# Patient Record
Sex: FEMALE | Race: WHITE | NOT HISPANIC OR LATINO | Employment: FULL TIME | URBAN - METROPOLITAN AREA
[De-identification: names, ages, dates, MRNs, and addresses within clinical notes are randomized per-mention and may not be internally consistent; named-entity substitution may affect disease eponyms.]

---

## 2023-02-16 PROBLEM — G47.00 INSOMNIA, UNSPECIFIED: Status: ACTIVE | Noted: 2023-02-16

## 2023-02-16 PROBLEM — R53.83 FATIGUE: Status: ACTIVE | Noted: 2023-02-16

## 2023-02-16 PROBLEM — F33.0 MILD EPISODE OF RECURRENT MAJOR DEPRESSIVE DISORDER (CMS-HCC): Status: ACTIVE | Noted: 2023-02-16

## 2023-02-16 PROBLEM — F41.1 ANXIETY, GENERALIZED: Status: ACTIVE | Noted: 2023-02-16

## 2023-02-16 PROBLEM — R68.89 COLD INTOLERANCE: Status: ACTIVE | Noted: 2023-02-16

## 2023-02-16 PROBLEM — R00.0 TACHYCARDIA: Status: ACTIVE | Noted: 2023-02-16

## 2023-02-16 PROBLEM — N91.1 SECONDARY AMENORRHEA: Status: ACTIVE | Noted: 2023-02-16

## 2023-02-16 RX ORDER — ALBUTEROL SULFATE 90 UG/1
2 AEROSOL, METERED RESPIRATORY (INHALATION) EVERY 6 HOURS
COMMUNITY
Start: 2022-03-24 | End: 2023-03-27 | Stop reason: ALTCHOICE

## 2023-02-16 RX ORDER — FLUOXETINE 10 MG/1
1 CAPSULE ORAL DAILY
COMMUNITY
Start: 2022-10-27 | End: 2023-03-29 | Stop reason: SDUPTHER

## 2023-02-16 RX ORDER — NORETHINDRONE ACETATE AND ETHINYL ESTRADIOL 1.5-30(21)
1 KIT ORAL DAILY
COMMUNITY
Start: 2021-01-04 | End: 2023-12-22

## 2023-03-27 PROBLEM — R53.83 FATIGUE: Status: RESOLVED | Noted: 2023-02-16 | Resolved: 2023-03-27

## 2023-03-27 PROBLEM — R00.0 TACHYCARDIA: Status: RESOLVED | Noted: 2023-02-16 | Resolved: 2023-03-27

## 2023-03-27 ASSESSMENT — ENCOUNTER SYMPTOMS
VOMITING: 0
CONFUSION: 0
DYSURIA: 0
TROUBLE SWALLOWING: 0
FEVER: 0
FREQUENCY: 0
ADENOPATHY: 0
BLOOD IN STOOL: 0
UNEXPECTED WEIGHT CHANGE: 0
PALPITATIONS: 0
NECK PAIN: 0
SORE THROAT: 0
ABDOMINAL PAIN: 0
BRUISES/BLEEDS EASILY: 0
EYE PAIN: 0
POLYDIPSIA: 0
WOUND: 0
HEADACHES: 0
HEMATURIA: 0
FATIGUE: 0
POLYPHAGIA: 0
EYE REDNESS: 0
SHORTNESS OF BREATH: 0
DIZZINESS: 0
HALLUCINATIONS: 0
BACK PAIN: 0
EYE DISCHARGE: 0
CHILLS: 0
COUGH: 0
APPETITE CHANGE: 0

## 2023-03-29 ENCOUNTER — OFFICE VISIT (OUTPATIENT)
Dept: PRIMARY CARE | Facility: CLINIC | Age: 28
End: 2023-03-29
Payer: COMMERCIAL

## 2023-03-29 VITALS
WEIGHT: 149.91 LBS | OXYGEN SATURATION: 95 % | DIASTOLIC BLOOD PRESSURE: 76 MMHG | SYSTOLIC BLOOD PRESSURE: 111 MMHG | BODY MASS INDEX: 26.56 KG/M2 | HEART RATE: 92 BPM | TEMPERATURE: 98.6 F | HEIGHT: 63 IN

## 2023-03-29 DIAGNOSIS — F33.0 MILD EPISODE OF RECURRENT MAJOR DEPRESSIVE DISORDER (CMS-HCC): ICD-10-CM

## 2023-03-29 DIAGNOSIS — F41.1 ANXIETY, GENERALIZED: ICD-10-CM

## 2023-03-29 DIAGNOSIS — Z00.00 ROUTINE GENERAL MEDICAL EXAMINATION AT A HEALTH CARE FACILITY: Primary | ICD-10-CM

## 2023-03-29 PROCEDURE — 3008F BODY MASS INDEX DOCD: CPT | Performed by: NURSE PRACTITIONER

## 2023-03-29 PROCEDURE — 99395 PREV VISIT EST AGE 18-39: CPT | Performed by: NURSE PRACTITIONER

## 2023-03-29 PROCEDURE — 1036F TOBACCO NON-USER: CPT | Performed by: NURSE PRACTITIONER

## 2023-03-29 RX ORDER — FLUOXETINE 10 MG/1
10 CAPSULE ORAL DAILY
Qty: 90 CAPSULE | Refills: 3 | Status: SHIPPED | OUTPATIENT
Start: 2023-03-29 | End: 2023-06-01 | Stop reason: SDUPTHER

## 2023-03-29 ASSESSMENT — ANXIETY QUESTIONNAIRES
4. TROUBLE RELAXING: NOT AT ALL
3. WORRYING TOO MUCH ABOUT DIFFERENT THINGS: NOT AT ALL
7. FEELING AFRAID AS IF SOMETHING AWFUL MIGHT HAPPEN: NOT AT ALL
1. FEELING NERVOUS, ANXIOUS, OR ON EDGE: NOT AT ALL
IF YOU CHECKED OFF ANY PROBLEMS ON THIS QUESTIONNAIRE, HOW DIFFICULT HAVE THESE PROBLEMS MADE IT FOR YOU TO DO YOUR WORK, TAKE CARE OF THINGS AT HOME, OR GET ALONG WITH OTHER PEOPLE: NOT DIFFICULT AT ALL
5. BEING SO RESTLESS THAT IT IS HARD TO SIT STILL: NOT AT ALL
2. NOT BEING ABLE TO STOP OR CONTROL WORRYING: NOT AT ALL
GAD7 TOTAL SCORE: 0
6. BECOMING EASILY ANNOYED OR IRRITABLE: NOT AT ALL

## 2023-03-29 ASSESSMENT — PATIENT HEALTH QUESTIONNAIRE - PHQ9
SUM OF ALL RESPONSES TO PHQ9 QUESTIONS 1 AND 2: 0
1. LITTLE INTEREST OR PLEASURE IN DOING THINGS: NOT AT ALL
6. FEELING BAD ABOUT YOURSELF - OR THAT YOU ARE A FAILURE OR HAVE LET YOURSELF OR YOUR FAMILY DOWN: NOT AT ALL
7. TROUBLE CONCENTRATING ON THINGS, SUCH AS READING THE NEWSPAPER OR WATCHING TELEVISION: NOT AT ALL
10. IF YOU CHECKED OFF ANY PROBLEMS, HOW DIFFICULT HAVE THESE PROBLEMS MADE IT FOR YOU TO DO YOUR WORK, TAKE CARE OF THINGS AT HOME, OR GET ALONG WITH OTHER PEOPLE: NOT DIFFICULT AT ALL
SUM OF ALL RESPONSES TO PHQ QUESTIONS 1-9: 0
SUM OF ALL RESPONSES TO PHQ9 QUESTIONS 1 AND 2: 0
2. FEELING DOWN, DEPRESSED OR HOPELESS: NOT AT ALL
8. MOVING OR SPEAKING SO SLOWLY THAT OTHER PEOPLE COULD HAVE NOTICED. OR THE OPPOSITE, BEING SO FIGETY OR RESTLESS THAT YOU HAVE BEEN MOVING AROUND A LOT MORE THAN USUAL: NOT AT ALL
1. LITTLE INTEREST OR PLEASURE IN DOING THINGS: NOT AT ALL
4. FEELING TIRED OR HAVING LITTLE ENERGY: NOT AT ALL
3. TROUBLE FALLING OR STAYING ASLEEP OR SLEEPING TOO MUCH: NOT AT ALL
5. POOR APPETITE OR OVEREATING: NOT AT ALL
9. THOUGHTS THAT YOU WOULD BE BETTER OFF DEAD, OR OF HURTING YOURSELF: NOT AT ALL
2. FEELING DOWN, DEPRESSED OR HOPELESS: NOT AT ALL

## 2023-03-29 NOTE — PATIENT INSTRUCTIONS
Pt will let me know when she wants to switch to sertraline due to preparing for pregnancy and breastfeeding    Meds refilled. The number of refills on the meds match when you need to return to the office for an appt. I recommend making your next appt today so you don't run out of your medication as it may take me up to 3 days to refill it.    Handouts given to pt:  physical handout      I recommend signing up for MyChart.    Labs:    You can use the lab in our building when fasting. The hrs are: Monday-Thursday, 7 a.m. - 6 p.m., Friday, 7 a.m. - 5 p.m., Saturday 8 a.m. - 12 noon.   No appt needed, BUT YOU DO NEED THE PAPER ORDER.    Fasting is no food, drink, gum or mints other than water for 12 hrs.   Results will be back in 2-3 business days for most labs. It is always recommended for any orders (labs, xrays, ultrasounds,MRI, ct scan, procedures etc) to check with your insurance provider for expected costs or expenses to you.       You will get your results via phone from my medical assistant if you do not have MyChart.  OR  You will get your results via [employer]    If a result is urgent, I will call to speak to you.    Vaccines:  Up to date    General recommendations:  Exercise-cardio 4-5d/wk 30min each day  Diet-Breakfast-toast (my favorite Nat Bryant Delighful Multigrain or Vimal's Killer Bread Good Seed thin-sliced)/bagel/English muffin-whole wheat flour as a 1st ingredient or cereal/oatmeal/granola bar-fiber 4g or more or protein like eggs or peanut butter; optional veggies  Lunch-protein, 1/2c carb or 2 slices bread, veg 1c  Dinner-protein, fist sized carb, veg 1c  Fruit 2 a day  Dairy 2 a day-milk, soy milk, almond milk, cheese, yogurt, cottage cheese  Snacks-Protein-hard boiled egg, nuts (walnuts/almonds/pecans/pistachios 1/4c), hummus, beef/deer jerky or meat sticks; vegetable, fruit, dairy-milk(1%, skim, almond, soy)/cheese (not a lot of cheddar)/yogurt (Greek is best-my favorite Dannon Fruit on the Bottom  Greek)/cottage cheese 2%; triscuits/ popcorn/wheat thins have a lot of fiber; follow serving size on bag/box/container  increase water  Limit alcohol to 1 drink per day for women and 2 drinks per day for men (1 drink=12oz beer or 5oz wine or 1 1/2oz liquor)  Calcium: 500mg 1 twice a day if age 50 and younger and 600mg 1 twice a day if over age 50 (calcium citrate can be taken without food)  Vitamin D: 800-5000 IU/day  Limit salt to <2300mg a day if age 50 and under and <1500mg a day if over age 50/have high bp or diabetes or kidney disease  Recommend folate for childbearing age women 0.4mg per day (can be found in a multivitamin)  Recommend 18mg/dL of iron a day if age 50 and under and 8mg/dL a day if over age 50; take on an empty stomach at bedtime  Use sunscreen   Wear seatbelt  Recommend safe sex practices: using condoms everytime you have sex, discuss with a new partner about their past partners/history of STDs/drug use, avoid drinking alcohol or using drugs as this increases the chance that you will participate in high-risk sex, for oral sex help protect your mouth by having your partner use a condom (male or female), women should not douche after sex, be aware of your partner's body and your body-look for signs of a sore, blister, rash, or discharge, and have regular exams and periodic tests for STDs.  No distracted driving  No driving when under influence of substances  Wear a seatbelt  Eye dr every 1-2yrs  Dentist every 6-12 mon  Tetanus shot every 10yrs  Recommend flu vaccine in the fall  Appt in 1 year for physical      I will communicate with you via [employer] regarding messages and results. If you need help with this, you can call the support line at [phone].    IT WAS A PLEASURE TO SEE YOU TODAY. THANK YOU FOR CHOOSING US FOR YOUR HEALTHCARE NEEDS.

## 2023-06-01 DIAGNOSIS — F33.0 MILD EPISODE OF RECURRENT MAJOR DEPRESSIVE DISORDER (CMS-HCC): ICD-10-CM

## 2023-06-01 DIAGNOSIS — F41.1 ANXIETY, GENERALIZED: ICD-10-CM

## 2023-06-01 RX ORDER — FLUOXETINE 10 MG/1
10 CAPSULE ORAL DAILY
Qty: 90 CAPSULE | Refills: 3 | Status: SHIPPED | OUTPATIENT
Start: 2023-06-01 | End: 2024-04-02 | Stop reason: ALTCHOICE

## 2023-08-24 ENCOUNTER — TELEPHONE (OUTPATIENT)
Dept: PRIMARY CARE | Facility: CLINIC | Age: 28
End: 2023-08-24
Payer: COMMERCIAL

## 2023-08-24 NOTE — TELEPHONE ENCOUNTER
Called patient on 8/21/23 to make an appointment for  physical and left a message on her machine to call back.

## 2023-09-13 ENCOUNTER — OFFICE VISIT (OUTPATIENT)
Dept: PRIMARY CARE | Facility: CLINIC | Age: 28
End: 2023-09-13
Payer: COMMERCIAL

## 2023-09-13 VITALS
BODY MASS INDEX: 28.63 KG/M2 | HEIGHT: 63 IN | HEART RATE: 80 BPM | DIASTOLIC BLOOD PRESSURE: 66 MMHG | WEIGHT: 161.6 LBS | OXYGEN SATURATION: 98 % | SYSTOLIC BLOOD PRESSURE: 118 MMHG

## 2023-09-13 DIAGNOSIS — R20.0 BILATERAL HAND NUMBNESS: Primary | ICD-10-CM

## 2023-09-13 PROCEDURE — 1036F TOBACCO NON-USER: CPT | Performed by: NURSE PRACTITIONER

## 2023-09-13 PROCEDURE — 99213 OFFICE O/P EST LOW 20 MIN: CPT | Performed by: NURSE PRACTITIONER

## 2023-09-13 PROCEDURE — 3008F BODY MASS INDEX DOCD: CPT | Performed by: NURSE PRACTITIONER

## 2023-09-13 RX ORDER — PREDNISONE 20 MG/1
TABLET ORAL
Qty: 20 TABLET | Refills: 0 | Status: SHIPPED | OUTPATIENT
Start: 2023-09-13 | End: 2024-01-29 | Stop reason: ALTCHOICE

## 2023-09-13 ASSESSMENT — ENCOUNTER SYMPTOMS
SHORTNESS OF BREATH: 0
CONSTITUTIONAL NEGATIVE: 1
WHEEZING: 0

## 2023-09-13 NOTE — PATIENT INSTRUCTIONS
Prednisone  No advil, motrin, ibuprofen, aleve, naproxen or other anti-inflammatories while on prednisone.  Tylenol (acetaminophen) is OK to take.     If not helping by Monday, then let me know and I can order a nerve conduction test.    Return for wellness in march      I will communicate with you via [employer] regarding messages and results. If you need help with this, you can call the support line at [phone].    IT WAS A PLEASURE TO SEE YOU TODAY. THANK YOU FOR CHOOSING US FOR YOUR HEALTHCARE NEEDS.
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
## 2023-09-26 DIAGNOSIS — R06.02 SOB (SHORTNESS OF BREATH) ON EXERTION: Primary | ICD-10-CM

## 2023-09-26 RX ORDER — ALBUTEROL SULFATE 90 UG/1
2 AEROSOL, METERED RESPIRATORY (INHALATION) EVERY 6 HOURS PRN
Qty: 54 G | Refills: 1 | Status: SHIPPED | OUTPATIENT
Start: 2023-09-26 | End: 2024-09-25

## 2023-12-21 DIAGNOSIS — N91.1 SECONDARY AMENORRHEA: Primary | ICD-10-CM

## 2023-12-22 RX ORDER — NORETHINDRONE ACETATE AND ETHINYL ESTRADIOL 1.5-30(21)
1 KIT ORAL DAILY
Qty: 90 TABLET | Refills: 0 | Status: SHIPPED | OUTPATIENT
Start: 2023-12-22 | End: 2024-02-23

## 2024-01-28 NOTE — PROGRESS NOTES
Subjective   Patient ID: Liz Arango is a 28 y.o. female who presents for Fatigue.  Last physical:  3/29/23      What kind of sx does pt get with these energy crashes? Extreme tiredness  When did these sx start? 1 month. States she's always had similar issues but those seemed to be explained with lack of sleep at the time.  Now pt is getting enough sleep and working out regularly. Very minimum caffeine and alcohol intake.   Any other questions or concerns that pt wants to discuss today? no    Phq9  gad7    HPI  Energy crash mid afternoon about 2hrs after lunch -extreme tiredness-lay down and fall asleep for an hr at the most  Breakfast -whole wheat bagel, yogurt, turkey lisa and egg white lisa  Lunch-high protein tuna or chicken with 2 bread-macie's killer bread and grapes  Exercise daily  7hrs of sleep a night  1c coffee in am  Water 1 gallon a day    Off fluoxetine x 3mon    Bedtime 10p-8a  Able to fall asleep   Able to stay asleep  Snoring-no  Gasping for breath at night-no  Stresses-none diff than usual  Phq9=0  Gad7=0  No fall or injury  No jt swelling or jt aches  No diarrhea or constipation  No cp or heart racing  No leg swelling  No sob or wheezing; yesterday some tightness and sob-did not use albuterol inh  No tick bite  No drug use  No mono exposure  No tick bite  Seas allergies in spring  Any new meds or otc meds? no  Regular periods          Patient Care Team     Relationship Specialty Notifications Start End   Anna Pineda MD Consulting Physician Pediatric Endocrinology Admissions 3/27/23     Address:  [address] [place] [address] Pediatrics-Endocrinology/Metabolism [place] [id number]        Review of Systems   Constitutional: Negative.    Respiratory:  Negative for shortness of breath and wheezing.    Cardiovascular:  Negative for chest pain.       Objective   Visit Vitals  /77   Pulse 96   Temp 36.4 °C (97.5 °F)      BP Readings from Last 3 Encounters:   01/29/24 128/77   09/13/23 118/66    03/29/23 111/76     Wt Readings from Last 3 Encounters:   01/29/24 78.4 kg (172 lb 12.8 oz)   09/13/23 73.3 kg (161 lb 9.6 oz)   03/29/23 68 kg (149 lb 14.6 oz)       Physical Exam  Constitutional:       General: She is not in acute distress.     Appearance: Normal appearance.   Neurological:      Mental Status: She is alert.       Assessment/Plan   Diagnoses and all orders for this visit:  Fatigue, unspecified type  -     CBC and Auto Differential; Future  -     TSH with reflex to Free T4 if abnormal; Future  -     Vitamin D 25-Hydroxy,Total (for eval of Vitamin D levels); Future  -     Vitamin B12; Future  -     Comprehensive Metabolic Panel; Future  -     Fredrick-Barr Virus VCA, IgM; Future  Decreased energy  -     CBC and Auto Differential; Future  -     TSH with reflex to Free T4 if abnormal; Future  -     Vitamin D 25-Hydroxy,Total (for eval of Vitamin D levels); Future  -     Vitamin B12; Future  -     Comprehensive Metabolic Panel; Future  -     Fredrick-Barr Virus VCA, IgM; Future  Other orders  -     Follow Up In Primary Care - Health Maintenance; Future

## 2024-01-29 ENCOUNTER — LAB (OUTPATIENT)
Dept: LAB | Facility: LAB | Age: 29
End: 2024-01-29
Payer: COMMERCIAL

## 2024-01-29 ENCOUNTER — OFFICE VISIT (OUTPATIENT)
Dept: PRIMARY CARE | Facility: CLINIC | Age: 29
End: 2024-01-29
Payer: COMMERCIAL

## 2024-01-29 VITALS
TEMPERATURE: 97.5 F | BODY MASS INDEX: 30.62 KG/M2 | WEIGHT: 172.8 LBS | HEART RATE: 96 BPM | OXYGEN SATURATION: 98 % | DIASTOLIC BLOOD PRESSURE: 77 MMHG | SYSTOLIC BLOOD PRESSURE: 128 MMHG | HEIGHT: 63 IN

## 2024-01-29 DIAGNOSIS — R53.83 FATIGUE, UNSPECIFIED TYPE: ICD-10-CM

## 2024-01-29 DIAGNOSIS — R53.83 DECREASED ENERGY: ICD-10-CM

## 2024-01-29 DIAGNOSIS — R53.83 FATIGUE, UNSPECIFIED TYPE: Primary | ICD-10-CM

## 2024-01-29 LAB
25(OH)D3 SERPL-MCNC: 62 NG/ML (ref 30–100)
ALBUMIN SERPL BCP-MCNC: 4.2 G/DL (ref 3.4–5)
ALP SERPL-CCNC: 32 U/L (ref 33–110)
ALT SERPL W P-5'-P-CCNC: 11 U/L (ref 7–45)
ANION GAP SERPL CALC-SCNC: 10 MMOL/L (ref 10–20)
AST SERPL W P-5'-P-CCNC: 15 U/L (ref 9–39)
BASOPHILS # BLD AUTO: 0.05 X10*3/UL (ref 0–0.1)
BASOPHILS NFR BLD AUTO: 0.7 %
BILIRUB SERPL-MCNC: 0.7 MG/DL (ref 0–1.2)
BUN SERPL-MCNC: 16 MG/DL (ref 6–23)
CALCIUM SERPL-MCNC: 9.4 MG/DL (ref 8.6–10.6)
CHLORIDE SERPL-SCNC: 105 MMOL/L (ref 98–107)
CO2 SERPL-SCNC: 28 MMOL/L (ref 21–32)
CREAT SERPL-MCNC: 0.86 MG/DL (ref 0.5–1.05)
EGFRCR SERPLBLD CKD-EPI 2021: >90 ML/MIN/1.73M*2
EOSINOPHIL # BLD AUTO: 0.05 X10*3/UL (ref 0–0.7)
EOSINOPHIL NFR BLD AUTO: 0.7 %
ERYTHROCYTE [DISTWIDTH] IN BLOOD BY AUTOMATED COUNT: 11.5 % (ref 11.5–14.5)
GLUCOSE SERPL-MCNC: 88 MG/DL (ref 74–99)
HCT VFR BLD AUTO: 41 % (ref 36–46)
HGB BLD-MCNC: 13.7 G/DL (ref 12–16)
IMM GRANULOCYTES # BLD AUTO: 0.03 X10*3/UL (ref 0–0.7)
IMM GRANULOCYTES NFR BLD AUTO: 0.4 % (ref 0–0.9)
LYMPHOCYTES # BLD AUTO: 2.5 X10*3/UL (ref 1.2–4.8)
LYMPHOCYTES NFR BLD AUTO: 34 %
MCH RBC QN AUTO: 30.6 PG (ref 26–34)
MCHC RBC AUTO-ENTMCNC: 33.4 G/DL (ref 32–36)
MCV RBC AUTO: 92 FL (ref 80–100)
MONOCYTES # BLD AUTO: 0.51 X10*3/UL (ref 0.1–1)
MONOCYTES NFR BLD AUTO: 6.9 %
NEUTROPHILS # BLD AUTO: 4.21 X10*3/UL (ref 1.2–7.7)
NEUTROPHILS NFR BLD AUTO: 57.3 %
NRBC BLD-RTO: 0 /100 WBCS (ref 0–0)
PLATELET # BLD AUTO: 245 X10*3/UL (ref 150–450)
POTASSIUM SERPL-SCNC: 4.1 MMOL/L (ref 3.5–5.3)
PROT SERPL-MCNC: 6.7 G/DL (ref 6.4–8.2)
RBC # BLD AUTO: 4.48 X10*6/UL (ref 4–5.2)
SODIUM SERPL-SCNC: 139 MMOL/L (ref 136–145)
TSH SERPL-ACNC: 0.86 MIU/L (ref 0.44–3.98)
VIT B12 SERPL-MCNC: 367 PG/ML (ref 211–911)
WBC # BLD AUTO: 7.4 X10*3/UL (ref 4.4–11.3)

## 2024-01-29 PROCEDURE — 82607 VITAMIN B-12: CPT

## 2024-01-29 PROCEDURE — 86665 EPSTEIN-BARR CAPSID VCA: CPT

## 2024-01-29 PROCEDURE — 1036F TOBACCO NON-USER: CPT | Performed by: NURSE PRACTITIONER

## 2024-01-29 PROCEDURE — 84443 ASSAY THYROID STIM HORMONE: CPT

## 2024-01-29 PROCEDURE — 3008F BODY MASS INDEX DOCD: CPT | Performed by: NURSE PRACTITIONER

## 2024-01-29 PROCEDURE — 99213 OFFICE O/P EST LOW 20 MIN: CPT | Performed by: NURSE PRACTITIONER

## 2024-01-29 PROCEDURE — 36415 COLL VENOUS BLD VENIPUNCTURE: CPT

## 2024-01-29 PROCEDURE — 80053 COMPREHEN METABOLIC PANEL: CPT

## 2024-01-29 PROCEDURE — 85025 COMPLETE CBC W/AUTO DIFF WBC: CPT

## 2024-01-29 PROCEDURE — 82306 VITAMIN D 25 HYDROXY: CPT

## 2024-01-29 ASSESSMENT — ANXIETY QUESTIONNAIRES
3. WORRYING TOO MUCH ABOUT DIFFERENT THINGS: NOT AT ALL
7. FEELING AFRAID AS IF SOMETHING AWFUL MIGHT HAPPEN: NOT AT ALL
6. BECOMING EASILY ANNOYED OR IRRITABLE: NOT AT ALL
5. BEING SO RESTLESS THAT IT IS HARD TO SIT STILL: NOT AT ALL
2. NOT BEING ABLE TO STOP OR CONTROL WORRYING: NOT AT ALL
1. FEELING NERVOUS, ANXIOUS, OR ON EDGE: NOT AT ALL
4. TROUBLE RELAXING: NOT AT ALL
GAD7 TOTAL SCORE: 0
IF YOU CHECKED OFF ANY PROBLEMS ON THIS QUESTIONNAIRE, HOW DIFFICULT HAVE THESE PROBLEMS MADE IT FOR YOU TO DO YOUR WORK, TAKE CARE OF THINGS AT HOME, OR GET ALONG WITH OTHER PEOPLE: NOT DIFFICULT AT ALL

## 2024-01-29 ASSESSMENT — PATIENT HEALTH QUESTIONNAIRE - PHQ9
SUM OF ALL RESPONSES TO PHQ QUESTIONS 1-9: 0
7. TROUBLE CONCENTRATING ON THINGS, SUCH AS READING THE NEWSPAPER OR WATCHING TELEVISION: NOT AT ALL
2. FEELING DOWN, DEPRESSED OR HOPELESS: NOT AT ALL
3. TROUBLE FALLING OR STAYING ASLEEP OR SLEEPING TOO MUCH: NOT AT ALL
6. FEELING BAD ABOUT YOURSELF - OR THAT YOU ARE A FAILURE OR HAVE LET YOURSELF OR YOUR FAMILY DOWN: NOT AT ALL
8. MOVING OR SPEAKING SO SLOWLY THAT OTHER PEOPLE COULD HAVE NOTICED. OR THE OPPOSITE, BEING SO FIGETY OR RESTLESS THAT YOU HAVE BEEN MOVING AROUND A LOT MORE THAN USUAL: NOT AT ALL
1. LITTLE INTEREST OR PLEASURE IN DOING THINGS: NOT AT ALL
SUM OF ALL RESPONSES TO PHQ9 QUESTIONS 1 AND 2: 0
4. FEELING TIRED OR HAVING LITTLE ENERGY: NOT AT ALL
5. POOR APPETITE OR OVEREATING: NOT AT ALL
9. THOUGHTS THAT YOU WOULD BE BETTER OFF DEAD, OR OF HURTING YOURSELF: NOT AT ALL

## 2024-01-29 ASSESSMENT — ENCOUNTER SYMPTOMS
SHORTNESS OF BREATH: 0
CONSTITUTIONAL NEGATIVE: 1
WHEEZING: 0

## 2024-01-29 NOTE — PATIENT INSTRUCTIONS
B complex vitamin  Add veggie at lunch  Snack- 1-2 hrs after lunch; next: move fruit to mid morning, dinner or evening snack  Zytec 1 a day x 2wks  Labs   Next: sleep med   Consider wellbutrin or fluoxetine if nothing above helping      I will communicate with you via [employer] regarding messages and results. If you need help with this, you can call the support line at [phone].    IT WAS A PLEASURE TO SEE YOU TODAY. THANK YOU FOR CHOOSING US FOR YOUR HEALTHCARE NEEDS.

## 2024-02-01 LAB — EBV VCA IGM SER-ACNC: <10 U/ML (ref 0–43.9)

## 2024-02-09 ENCOUNTER — HOSPITAL ENCOUNTER (OUTPATIENT)
Dept: RADIOLOGY | Facility: EXTERNAL LOCATION | Age: 29
Discharge: HOME | End: 2024-02-09

## 2024-02-09 ENCOUNTER — OFFICE VISIT (OUTPATIENT)
Dept: URGENT CARE | Facility: CLINIC | Age: 29
End: 2024-02-09
Payer: COMMERCIAL

## 2024-02-09 VITALS
HEART RATE: 98 BPM | TEMPERATURE: 97.8 F | SYSTOLIC BLOOD PRESSURE: 102 MMHG | BODY MASS INDEX: 29.76 KG/M2 | OXYGEN SATURATION: 99 % | DIASTOLIC BLOOD PRESSURE: 67 MMHG | WEIGHT: 168 LBS | RESPIRATION RATE: 18 BRPM

## 2024-02-09 DIAGNOSIS — S42.401A CLOSED FRACTURE DISLOCATION OF RIGHT ELBOW JOINT, INITIAL ENCOUNTER: ICD-10-CM

## 2024-02-09 DIAGNOSIS — M25.521 RIGHT ELBOW PAIN: Primary | ICD-10-CM

## 2024-02-09 DIAGNOSIS — S52.001A CLOSED FRACTURE OF PROXIMAL END OF RIGHT ULNA, UNSPECIFIED FRACTURE MORPHOLOGY, INITIAL ENCOUNTER: ICD-10-CM

## 2024-02-09 DIAGNOSIS — M25.521 RIGHT ELBOW PAIN: ICD-10-CM

## 2024-02-09 PROCEDURE — 29105 APPLICATION LONG ARM SPLINT: CPT | Performed by: FAMILY MEDICINE

## 2024-02-09 PROCEDURE — A4565 SLINGS: HCPCS | Performed by: FAMILY MEDICINE

## 2024-02-09 PROCEDURE — 1036F TOBACCO NON-USER: CPT | Performed by: FAMILY MEDICINE

## 2024-02-09 PROCEDURE — 3008F BODY MASS INDEX DOCD: CPT | Performed by: FAMILY MEDICINE

## 2024-02-09 PROCEDURE — 99214 OFFICE O/P EST MOD 30 MIN: CPT | Performed by: FAMILY MEDICINE

## 2024-02-09 ASSESSMENT — ENCOUNTER SYMPTOMS
COUGH: 0
SORE THROAT: 0
RHINORRHEA: 0
HEADACHES: 0
JOINT SWELLING: 1
CHILLS: 0
NAUSEA: 0
SHORTNESS OF BREATH: 0
VOMITING: 0
FEVER: 0
DYSURIA: 0
CONSTIPATION: 0
DIARRHEA: 0
FREQUENCY: 0
ARTHRALGIAS: 1
WHEEZING: 0
ABDOMINAL PAIN: 0

## 2024-02-09 NOTE — PATIENT INSTRUCTIONS
You have a closed displaced fracture of the proximal ulna of the right elbow.    Please apply cold compresses to affected area for 20-30 minutes 2-3 times daily for the first 3-5 days to reduce swelling.  May take Tylenol (acetaminophen) 325 mg, 2 tablets by mouth every 4-6 hours as needed for discomfort.   May take Motrin or Advil (ibuprofen) 200 mg, 3 tablets by mouth every 8 hours as needed for discomfort.   Please maintain splint is much as possible.  May remove to bathe.  Rest and elevate elbow when possible.  Please contact Dr. Alber Hernández at [facility] orthopedics for further follow-up.    If you have increased pain, redness, swelling, numbness, tingling, weakness return to urgent care or go to emergency department for further evaluation
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
## 2024-02-12 ENCOUNTER — OFFICE VISIT (OUTPATIENT)
Dept: ORTHOPEDIC SURGERY | Facility: CLINIC | Age: 29
End: 2024-02-12
Payer: COMMERCIAL

## 2024-02-12 ENCOUNTER — ANESTHESIA EVENT (OUTPATIENT)
Dept: OPERATING ROOM | Facility: HOSPITAL | Age: 29
End: 2024-02-12
Payer: COMMERCIAL

## 2024-02-12 DIAGNOSIS — S42.401A ELBOW FRACTURE, RIGHT, CLOSED, INITIAL ENCOUNTER: ICD-10-CM

## 2024-02-12 PROBLEM — S52.021A: Status: ACTIVE | Noted: 2024-02-13

## 2024-02-12 PROCEDURE — 24670 CLTX ULNAR FX PROX W/O MNPJ: CPT | Performed by: STUDENT IN AN ORGANIZED HEALTH CARE EDUCATION/TRAINING PROGRAM

## 2024-02-12 PROCEDURE — 3008F BODY MASS INDEX DOCD: CPT | Performed by: STUDENT IN AN ORGANIZED HEALTH CARE EDUCATION/TRAINING PROGRAM

## 2024-02-12 PROCEDURE — 99214 OFFICE O/P EST MOD 30 MIN: CPT | Mod: 57 | Performed by: STUDENT IN AN ORGANIZED HEALTH CARE EDUCATION/TRAINING PROGRAM

## 2024-02-12 PROCEDURE — 1036F TOBACCO NON-USER: CPT | Performed by: STUDENT IN AN ORGANIZED HEALTH CARE EDUCATION/TRAINING PROGRAM

## 2024-02-12 PROCEDURE — 99215 OFFICE O/P EST HI 40 MIN: CPT | Performed by: STUDENT IN AN ORGANIZED HEALTH CARE EDUCATION/TRAINING PROGRAM

## 2024-02-12 RX ORDER — OXYCODONE HYDROCHLORIDE 5 MG/1
5 TABLET ORAL EVERY 4 HOURS PRN
Status: CANCELLED | OUTPATIENT
Start: 2024-02-12

## 2024-02-12 RX ORDER — OXYCODONE AND ACETAMINOPHEN 5; 325 MG/1; MG/1
1 TABLET ORAL EVERY 6 HOURS PRN
Qty: 5 TABLET | Refills: 0 | Status: SHIPPED | OUTPATIENT
Start: 2024-02-12 | End: 2024-02-19
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
## 2024-02-13 ENCOUNTER — APPOINTMENT (OUTPATIENT)
Dept: RADIOLOGY | Facility: HOSPITAL | Age: 29
End: 2024-02-13
Payer: COMMERCIAL

## 2024-02-13 ENCOUNTER — HOSPITAL ENCOUNTER (OUTPATIENT)
Facility: HOSPITAL | Age: 29
Setting detail: OUTPATIENT SURGERY
Discharge: HOME | End: 2024-02-13
Attending: STUDENT IN AN ORGANIZED HEALTH CARE EDUCATION/TRAINING PROGRAM | Admitting: STUDENT IN AN ORGANIZED HEALTH CARE EDUCATION/TRAINING PROGRAM
Payer: COMMERCIAL

## 2024-02-13 ENCOUNTER — ANESTHESIA (OUTPATIENT)
Dept: OPERATING ROOM | Facility: HOSPITAL | Age: 29
End: 2024-02-13
Payer: COMMERCIAL

## 2024-02-13 VITALS
WEIGHT: 167.77 LBS | RESPIRATION RATE: 18 BRPM | SYSTOLIC BLOOD PRESSURE: 117 MMHG | OXYGEN SATURATION: 98 % | HEIGHT: 63 IN | BODY MASS INDEX: 29.73 KG/M2 | DIASTOLIC BLOOD PRESSURE: 72 MMHG | HEART RATE: 81 BPM | TEMPERATURE: 97.5 F

## 2024-02-13 DIAGNOSIS — S52.021A DISPLACED FRACTURE OF OLECRANON PROCESS WITHOUT INTRAARTICULAR EXTENSION OF RIGHT ULNA, INITIAL ENCOUNTER FOR CLOSED FRACTURE: Primary | ICD-10-CM

## 2024-02-13 LAB — PREGNANCY TEST URINE, POC: NEGATIVE

## 2024-02-13 PROCEDURE — 3700000001 HC GENERAL ANESTHESIA TIME - INITIAL BASE CHARGE: Performed by: STUDENT IN AN ORGANIZED HEALTH CARE EDUCATION/TRAINING PROGRAM

## 2024-02-13 PROCEDURE — 2500000005 HC RX 250 GENERAL PHARMACY W/O HCPCS: Performed by: NURSE ANESTHETIST, CERTIFIED REGISTERED

## 2024-02-13 PROCEDURE — 2780000003 HC OR 278 NO HCPCS: Performed by: STUDENT IN AN ORGANIZED HEALTH CARE EDUCATION/TRAINING PROGRAM

## 2024-02-13 PROCEDURE — 24685 OPTX ULNAR FX PROX END W/FIX: CPT | Performed by: STUDENT IN AN ORGANIZED HEALTH CARE EDUCATION/TRAINING PROGRAM

## 2024-02-13 PROCEDURE — 2500000004 HC RX 250 GENERAL PHARMACY W/ HCPCS (ALT 636 FOR OP/ED): Performed by: NURSE ANESTHETIST, CERTIFIED REGISTERED

## 2024-02-13 PROCEDURE — 24685 OPTX ULNAR FX PROX END W/FIX: CPT | Performed by: PHYSICIAN ASSISTANT

## 2024-02-13 PROCEDURE — 7100000002 HC RECOVERY ROOM TIME - EACH INCREMENTAL 1 MINUTE: Performed by: STUDENT IN AN ORGANIZED HEALTH CARE EDUCATION/TRAINING PROGRAM

## 2024-02-13 PROCEDURE — 2500000004 HC RX 250 GENERAL PHARMACY W/ HCPCS (ALT 636 FOR OP/ED): Performed by: ANESTHESIOLOGY

## 2024-02-13 PROCEDURE — 7100000010 HC PHASE TWO TIME - EACH INCREMENTAL 1 MINUTE: Performed by: STUDENT IN AN ORGANIZED HEALTH CARE EDUCATION/TRAINING PROGRAM

## 2024-02-13 PROCEDURE — A4217 STERILE WATER/SALINE, 500 ML: HCPCS | Performed by: STUDENT IN AN ORGANIZED HEALTH CARE EDUCATION/TRAINING PROGRAM

## 2024-02-13 PROCEDURE — 81025 URINE PREGNANCY TEST: CPT | Performed by: STUDENT IN AN ORGANIZED HEALTH CARE EDUCATION/TRAINING PROGRAM

## 2024-02-13 PROCEDURE — 3600000004 HC OR TIME - INITIAL BASE CHARGE - PROCEDURE LEVEL FOUR: Performed by: STUDENT IN AN ORGANIZED HEALTH CARE EDUCATION/TRAINING PROGRAM

## 2024-02-13 PROCEDURE — 3600000009 HC OR TIME - EACH INCREMENTAL 1 MINUTE - PROCEDURE LEVEL FOUR: Performed by: STUDENT IN AN ORGANIZED HEALTH CARE EDUCATION/TRAINING PROGRAM

## 2024-02-13 PROCEDURE — 2500000004 HC RX 250 GENERAL PHARMACY W/ HCPCS (ALT 636 FOR OP/ED): Performed by: STUDENT IN AN ORGANIZED HEALTH CARE EDUCATION/TRAINING PROGRAM

## 2024-02-13 PROCEDURE — 3700000002 HC GENERAL ANESTHESIA TIME - EACH INCREMENTAL 1 MINUTE: Performed by: STUDENT IN AN ORGANIZED HEALTH CARE EDUCATION/TRAINING PROGRAM

## 2024-02-13 PROCEDURE — 7100000001 HC RECOVERY ROOM TIME - INITIAL BASE CHARGE: Performed by: STUDENT IN AN ORGANIZED HEALTH CARE EDUCATION/TRAINING PROGRAM

## 2024-02-13 PROCEDURE — 7100000009 HC PHASE TWO TIME - INITIAL BASE CHARGE: Performed by: STUDENT IN AN ORGANIZED HEALTH CARE EDUCATION/TRAINING PROGRAM

## 2024-02-13 PROCEDURE — 2720000007 HC OR 272 NO HCPCS: Performed by: STUDENT IN AN ORGANIZED HEALTH CARE EDUCATION/TRAINING PROGRAM

## 2024-02-13 DEVICE — IMPLANTABLE DEVICE: Type: IMPLANTABLE DEVICE | Site: ELBOW | Status: FUNCTIONAL

## 2024-02-13 RX ORDER — CEFAZOLIN SODIUM 2 G/100ML
2 INJECTION, SOLUTION INTRAVENOUS ONCE
Status: COMPLETED | OUTPATIENT
Start: 2024-02-13 | End: 2024-02-13

## 2024-02-13 RX ORDER — FENTANYL CITRATE 50 UG/ML
50 INJECTION, SOLUTION INTRAMUSCULAR; INTRAVENOUS EVERY 5 MIN PRN
Status: DISCONTINUED | OUTPATIENT
Start: 2024-02-13 | End: 2024-02-13 | Stop reason: HOSPADM

## 2024-02-13 RX ORDER — SODIUM CHLORIDE 0.9 G/100ML
IRRIGANT IRRIGATION AS NEEDED
Status: DISCONTINUED | OUTPATIENT
Start: 2024-02-13 | End: 2024-02-13 | Stop reason: HOSPADM

## 2024-02-13 RX ORDER — DEXAMETHASONE SODIUM PHOSPHATE 4 MG/ML
INJECTION, SOLUTION INTRA-ARTICULAR; INTRALESIONAL; INTRAMUSCULAR; INTRAVENOUS; SOFT TISSUE AS NEEDED
Status: DISCONTINUED | OUTPATIENT
Start: 2024-02-13 | End: 2024-02-13

## 2024-02-13 RX ORDER — SODIUM CHLORIDE, SODIUM LACTATE, POTASSIUM CHLORIDE, CALCIUM CHLORIDE 600; 310; 30; 20 MG/100ML; MG/100ML; MG/100ML; MG/100ML
100 INJECTION, SOLUTION INTRAVENOUS CONTINUOUS
Status: DISCONTINUED | OUTPATIENT
Start: 2024-02-13 | End: 2024-02-13 | Stop reason: HOSPADM

## 2024-02-13 RX ORDER — PROPOFOL 10 MG/ML
INJECTION, EMULSION INTRAVENOUS AS NEEDED
Status: DISCONTINUED | OUTPATIENT
Start: 2024-02-13 | End: 2024-02-13

## 2024-02-13 RX ORDER — MEPERIDINE HYDROCHLORIDE 25 MG/ML
12.5 INJECTION INTRAMUSCULAR; INTRAVENOUS; SUBCUTANEOUS EVERY 10 MIN PRN
Status: DISCONTINUED | OUTPATIENT
Start: 2024-02-13 | End: 2024-02-13 | Stop reason: HOSPADM

## 2024-02-13 RX ORDER — FENTANYL CITRATE 50 UG/ML
INJECTION, SOLUTION INTRAMUSCULAR; INTRAVENOUS AS NEEDED
Status: DISCONTINUED | OUTPATIENT
Start: 2024-02-13 | End: 2024-02-13

## 2024-02-13 RX ORDER — DIPHENHYDRAMINE HYDROCHLORIDE 50 MG/ML
INJECTION INTRAMUSCULAR; INTRAVENOUS AS NEEDED
Status: DISCONTINUED | OUTPATIENT
Start: 2024-02-13 | End: 2024-02-13

## 2024-02-13 RX ORDER — ONDANSETRON HYDROCHLORIDE 2 MG/ML
INJECTION, SOLUTION INTRAVENOUS AS NEEDED
Status: DISCONTINUED | OUTPATIENT
Start: 2024-02-13 | End: 2024-02-13

## 2024-02-13 RX ORDER — ROCURONIUM BROMIDE 10 MG/ML
INJECTION, SOLUTION INTRAVENOUS AS NEEDED
Status: DISCONTINUED | OUTPATIENT
Start: 2024-02-13 | End: 2024-02-13

## 2024-02-13 RX ORDER — ONDANSETRON HYDROCHLORIDE 2 MG/ML
4 INJECTION, SOLUTION INTRAVENOUS ONCE AS NEEDED
Status: DISCONTINUED | OUTPATIENT
Start: 2024-02-13 | End: 2024-02-13 | Stop reason: HOSPADM

## 2024-02-13 RX ORDER — MIDAZOLAM HYDROCHLORIDE 1 MG/ML
INJECTION, SOLUTION INTRAMUSCULAR; INTRAVENOUS AS NEEDED
Status: DISCONTINUED | OUTPATIENT
Start: 2024-02-13 | End: 2024-02-13

## 2024-02-13 RX ORDER — LIDOCAINE HYDROCHLORIDE 20 MG/ML
INJECTION, SOLUTION INFILTRATION; PERINEURAL AS NEEDED
Status: DISCONTINUED | OUTPATIENT
Start: 2024-02-13 | End: 2024-02-13

## 2024-02-13 RX ADMIN — SODIUM CHLORIDE, POTASSIUM CHLORIDE, SODIUM LACTATE AND CALCIUM CHLORIDE 100 ML/HR: 600; 310; 30; 20 INJECTION, SOLUTION INTRAVENOUS at 10:47

## 2024-02-13 RX ADMIN — MEPERIDINE HYDROCHLORIDE 12.5 MG: 25 INJECTION INTRAMUSCULAR; INTRAVENOUS; SUBCUTANEOUS at 16:24

## 2024-02-13 RX ADMIN — PROPOFOL 200 MG: 10 INJECTION, EMULSION INTRAVENOUS at 14:39

## 2024-02-13 RX ADMIN — SUGAMMADEX 200 MG: 100 INJECTION, SOLUTION INTRAVENOUS at 16:00

## 2024-02-13 RX ADMIN — FENTANYL CITRATE 75 MCG: 50 INJECTION, SOLUTION INTRAMUSCULAR; INTRAVENOUS at 16:00

## 2024-02-13 RX ADMIN — DIPHENHYDRAMINE HYDROCHLORIDE 12.5 MG: 50 INJECTION INTRAMUSCULAR; INTRAVENOUS at 14:50

## 2024-02-13 RX ADMIN — CEFAZOLIN SODIUM 2 G: 2 INJECTION, SOLUTION INTRAVENOUS at 14:33

## 2024-02-13 RX ADMIN — LIDOCAINE HYDROCHLORIDE 100 MG: 20 INJECTION, SOLUTION INFILTRATION; PERINEURAL at 14:39

## 2024-02-13 RX ADMIN — ONDANSETRON 4 MG: 2 INJECTION, SOLUTION INTRAMUSCULAR; INTRAVENOUS at 15:50

## 2024-02-13 RX ADMIN — MIDAZOLAM HYDROCHLORIDE 5 MG: 1 INJECTION, SOLUTION INTRAMUSCULAR; INTRAVENOUS at 13:33

## 2024-02-13 RX ADMIN — DEXAMETHASONE SODIUM PHOSPHATE: 10 INJECTION INTRAMUSCULAR; INTRAVENOUS at 13:29

## 2024-02-13 RX ADMIN — DEXAMETHASONE SODIUM PHOSPHATE 12 MG: 4 INJECTION, SOLUTION INTRAMUSCULAR; INTRAVENOUS at 14:45

## 2024-02-13 RX ADMIN — ROCURONIUM BROMIDE 50 MG: 10 SOLUTION INTRAVENOUS at 14:39

## 2024-02-13 RX ADMIN — FENTANYL CITRATE 25 MCG: 50 INJECTION, SOLUTION INTRAMUSCULAR; INTRAVENOUS at 15:02

## 2024-02-13 SDOH — HEALTH STABILITY: MENTAL HEALTH: CURRENT SMOKER: 0

## 2024-02-13 ASSESSMENT — PAIN SCALES - GENERAL
PAINLEVEL_OUTOF10: 0 - NO PAIN
PAINLEVEL_OUTOF10: 0 - NO PAIN
PAINLEVEL_OUTOF10: 2
PAINLEVEL_OUTOF10: 0 - NO PAIN
PAINLEVEL_OUTOF10: 2
PAINLEVEL_OUTOF10: 1
PAINLEVEL_OUTOF10: 1
PAINLEVEL_OUTOF10: 0 - NO PAIN
PAINLEVEL_OUTOF10: 3
PAINLEVEL_OUTOF10: 2

## 2024-02-13 ASSESSMENT — COLUMBIA-SUICIDE SEVERITY RATING SCALE - C-SSRS
6. HAVE YOU EVER DONE ANYTHING, STARTED TO DO ANYTHING, OR PREPARED TO DO ANYTHING TO END YOUR LIFE?: NO
1. IN THE PAST MONTH, HAVE YOU WISHED YOU WERE DEAD OR WISHED YOU COULD GO TO SLEEP AND NOT WAKE UP?: NO
2. HAVE YOU ACTUALLY HAD ANY THOUGHTS OF KILLING YOURSELF?: NO
6. HAVE YOU EVER DONE ANYTHING, STARTED TO DO ANYTHING, OR PREPARED TO DO ANYTHING TO END YOUR LIFE?: YES

## 2024-02-13 ASSESSMENT — PAIN - FUNCTIONAL ASSESSMENT
PAIN_FUNCTIONAL_ASSESSMENT: 0-10

## 2024-02-13 ASSESSMENT — PAIN DESCRIPTION - DESCRIPTORS
DESCRIPTORS: SORE
DESCRIPTORS: SORE
DESCRIPTORS: PRESSURE
DESCRIPTORS: PRESSURE;DULL
DESCRIPTORS: SORE
DESCRIPTORS: PRESSURE

## 2024-02-13 NOTE — ANESTHESIA PREPROCEDURE EVALUATION
Patient: Liz Arango    Procedure Information       Date/Time: 02/13/24 1215    Procedure: Open Reduction Internal Fixation Right Olecranon C-ARM, SYNTHES OLECRANON (Right: Elbow)    Location: [facility] / [facility]    Surgeons: Azar Coles MD            Relevant Problems   Neuro/Psych   (+) Anxiety, generalized   (+) Mild episode of recurrent major depressive disorder (CMS/HCC)       Clinical information reviewed:    Allergies  Meds     OB Status           NPO Detail:  NPO/Void Status  Carbohydrate Drink Given Prior to Surgery? : N  Date of Last Liquid: 02/12/24  Time of Last Liquid: 2100  Date of Last Solid: 02/12/24  Time of Last Solid: 2100  Last Intake Type: Clear fluids  Time of Last Void: 1015         Physical Exam    Airway  Mallampati: II  TM distance: >3 FB  Neck ROM: full     Cardiovascular    Dental - normal exam     Pulmonary    Abdominal        Anesthesia Plan    History of general anesthesia?: yes  History of complications of general anesthesia?: no    ASA 1     general   (Supraclavicular nerve block)  The patient is not a current smoker.    intravenous induction   Anesthetic plan and risks discussed with patient.    Plan discussed with CRNA.

## 2024-02-13 NOTE — ANESTHESIA PROCEDURE NOTES
Airway  Date/Time: 2/13/2024 3:11 PM  Urgency: elective      Staffing  Performed: CRNA   Authorized by: Kip Allred MD    Performed by: SHARIF Eubanks-DINORA  Patient location during procedure: OR    Indications and Patient Condition  Indications for airway management: anesthesia  Spontaneous Ventilation: absent  Sedation level: deep  Preoxygenated: yes  Patient position: sniffing  MILS maintained throughout  Mask difficulty assessment: 1 - vent by mask  Planned trial extubation    Final Airway Details  Final airway type: endotracheal airway      Successful airway: ETT  Cuffed: yes   Successful intubation technique: direct laryngoscopy  Facilitating devices/methods: intubating stylet  Blade: Jose  Blade size: #4  ETT size (mm): 7.5  Placement verified by: chest auscultation and capnometry   Measured from: teeth  ETT to teeth (cm): 21  Number of attempts at approach: 2  Ventilation between attempts: BVM

## 2024-02-13 NOTE — ANESTHESIA POSTPROCEDURE EVALUATION
Patient: Liz Arango    Procedure Summary       Date: 02/13/24 Room / Location: [facility] / [facility]    Anesthesia Start: 1429 Anesthesia Stop: 1613    Procedure: Open Reduction Internal Fixation Right Olecranon (Right: Elbow) Diagnosis:       Displaced fracture of olecranon process without intraarticular extension of right ulna, initial encounter for closed fracture      (Displaced fracture of olecranon process without intraarticular extension of right ulna, initial encounter for closed fracture [S52.021A])    Surgeons: Azar Coles MD Responsible Provider: Kip Allred MD    Anesthesia Type: general ASA Status: 1            Anesthesia Type: general    Vitals shown include unvalidated device data.    Anesthesia Post Evaluation    Patient location during evaluation: PACU  Patient participation: complete - patient participated  Level of consciousness: awake and alert  Pain management: adequate  Airway patency: patent  Cardiovascular status: acceptable  Respiratory status: acceptable  Hydration status: acceptable  Postoperative Nausea and Vomiting: none      No notable events documented.

## 2024-02-13 NOTE — OP NOTE
Open Reduction Internal Fixation Right Olecranon (R) Operative Note    Date: 2024  OR Location: [facility]    Name: Liz Arango YOB: 1995, Age: 28 y.o., MRN: [id number], Sex: female    Diagnosis  Pre-op Diagnosis     * Displaced fracture of olecranon process without intraarticular extension of right ulna, initial encounter for closed fracture [S52.021A] Post-op Diagnosis     * Displaced fracture of olecranon process without intraarticular extension of right ulna, initial encounter for closed fracture [S52.021A]     Procedures  Open Reduction Internal Fixation Right Olecranon  [address] - [place] OPEN TREATMENT ULNAR FRACTURE PROXIMAL END      Surgeons      * Azar Coles - Primary    Resident/Fellow/Other Assistant:  Surgeon(s) and Role:     * Howard Benedict PA-C - Assisting    Procedure Summary  Anesthesia: General  ASA: I  Anesthesia Staff: Anesthesiologist: John Newby DO; Ervin Newton MD; Kip Allred MD  CRNA: SHARIF Winchester-CRNA; SHARIF Eubanks-CRNA  Estimated Blood Loss: 20mL  Intra-op Medications:   Administrations occurring from 1215 to 1425 on 24:   Medication Name Total Dose   dexAMETHasone (PF) (Decadron) 5 mg, EPINEPHrine HCl (PF) (Adrenalin) 0.15 mg, ropivacaine (Naropin) 150 mg injection Cannot be calculated              Anesthesia Record               Intraprocedure I/O Totals       None           Specimen: No specimens collected     Staff:   Circulator: Emely Ashton RN  Scrub Person: Carlene Allen; Michelle Morin         Drains and/or Catheters: * None in log *    Tourniquet Times:         Implants:     Findings: see procedure details    Clinical History/Indication for Surgery  The patient is a 28-year-old female who was presents for operative fixation of a displaced olecranon fracture.  Typical indications for surgery were reviewed and operative fixation was recommended.  Risks of fracture surgery in general were reviewed including, but not limited to,  infection, non-union, need for additional procedures, painful or prominent hardware which could require removal, failure of fixation which would require revision, damage to normal structures as well as medical complications such as MI, stroke, PE, DVT, and even death. Specific indications and/or risks of this procedure were discussed as well including, but not limited to: infection, hardware prominence, need for hardware removal, malunion, nonunion.  Pt was given opportunity to ask questions and consider options.  She ultimately elected to proceed with surgery.  No guarantees were given or implied.     Operative Narration  The patient was identified in the pre-operative holding area.  The surgical site was identified and marked. Informed consent was obtained.  The patient was then brought to the operating room and placed supine on the operating table.  Anesthesia was administered and care of the head, neck, and airway was maintained by the anesthesia staff throughout the entire procedure.  All bony prominences were identified and padded. A tourniquet was applied to the operative extremity which was then prepped and draped in the usual sterile fashion.  A surgical timeout was performed. Antibiotics were confirmed to have been given.  After exsanguination the tourniquet was inflated.    An incision was made just lateral to the olecranon tip.  The skin and subcutaneous tissues or incised hemostasis was obtained with Bovie cautery.  The subcutaneous border of the ulna was immediately identified.  Underlying fracture was then visualized and mobilized with combination of [place] elevator and curettes.  Interposed soft tissue and periosteum was removed.  The fracture site was copiously irrigated and subsequently reduced.  Anatomic reduction was confirmed with fluoroscopy as well as direct visualization.  Under live fluoroscopy a single 7.5 mm cannulated screw was placed with excellent compression about the fracture obtained.   Final fluoroscopic images were obtained confirming anatomic reduction and appropriate placement of hardware ensuring no placement of screws within the elbow joint or proximal radioulnar joint.    Once the patient was awakened from anesthesia, they were transported to the PACU in stable condition, having tolerated surgery well with no immediate complications.    POST OPERATIVE PLAN  Follow up: 2 weeks for incision check and suture removal.  Weight Bearing: Noweightbearing operative extremity  DVT Prophylaxis: Not indicated for upper extremity surgery, generalized ambulation  X-Rays at follow up: 3 views AP lateral and oblique operative elbow  Pain Medications: As prescribed  Other:    Azar Coles III, MD          Complications:  None; patient tolerated the procedure well.    Disposition: PACU - hemodynamically stable.  Condition: stable         Azar Coles  Phone Number: [phone]

## 2024-02-13 NOTE — ANESTHESIA PROCEDURE NOTES
Peripheral Block    Patient location during procedure: holding area  Start time: 2/13/2024 1:33 PM  End time: 2/13/2024 1:38 PM  Reason for block: at surgeon's request and post-op pain management  Staffing  Performed: attending   Authorized by: Kip Allred MD    Performed by: Kip Allred MD  Preanesthetic Checklist  Completed: patient identified, IV checked, site marked, risks and benefits discussed, surgical consent, monitors and equipment checked, pre-op evaluation and timeout performed   Timeout performed at: 2/13/2024 1:33 PM  Peripheral Block  Patient position: laying flat  Prep: ChloraPrep  Patient monitoring: heart rate, cardiac monitor and continuous pulse ox  Block type: interscalene  Laterality: right  Injection technique: single-shot  Guidance: ultrasound guided  Local infiltration: lidocaine  Infiltration strength: 1 %  Dose: 1 mL  Needle  Needle type: short-bevel   Needle gauge: 21 G  Needle length: 10 cm  Needle localization: ultrasound guidance     image stored in chart  Needle insertion depth: 2 cm  Assessment  Injection assessment: negative aspiration for heme, no paresthesia on injection, incremental injection and local visualized surrounding nerve on ultrasound  Paresthesia pain: none  Heart rate change: no  Slow fractionated injection: yes  Additional Notes  Risks, benefits, complications and alternatives discussed with patient.  Patient agrees to proceed with procedure.  Midazolam 5mg IV.  US guided, lateral IP approach.  Total of 20cc 0.5% ropivacaine with epi 1:200k and decadron 5mg injected in 3-5cc aliquots around plexus after negative aspirations.  No complications noted.

## 2024-02-13 NOTE — DISCHARGE INSTRUCTIONS
POST-OPERATIVE INSTRUCTIONS:  Olecranon FRACTURE FIXATION  Dr. Azar Coles III, MD    ACTIVITY  ·You may not bear weight on the operated arm (lean on arm, push off of a surface, carry objects) until cleared by .  ·You should move (straighten and bend) your fingers at least 10 times per day within your comfort to decrease swelling and prevent stiffness.  ·You may be able to do some typing or writing right after surgery. But, swelling or stiffness may make it hard to do these things for 3 to 4 weeks after surgery.    PAIN & INFLAMMATION CONTROL    ·Elevation - You may notice that your fingers will get swollen if your arm is hanging by your side for longs periods of time, therefore keep your arm elevated above your heart as much as possible for the first 2 to 3 days.  ·Ice - You may use an ice pack for up to 20 minutes at a time over the surgical dressing to help reduce swelling in your hand. Place a thin cloth between the ice pack and your skin or dressing to protect your skin.  ·Medication  You will be prescribed Percocet or Norco, take this as prescribed. Use over the counter ibuprofen as well, every 6 hours alternating this and your prescribed pain medication every 6 hours  Common side effects of the pain medication are nausea, drowsiness, and constipation.  Please call the clinic if you are experiencing trouble with any of these symptoms. Take stool softeners as instructed over the counter.  Do not drive a car or operate machinery while taking narcotic pain medications.  If you think you will require a refill on your medication, you MUST do so during our regular weekday office hours  Ok to take Ibuprofen 400mg every 6 hours. This will provide more pain control. Recommend taking Percocet or Norco (whatever is prescribed) at time 0, then 3 hours later take ibuprofen, 3 hours later take Percocet etc etc. This way you will have pain medication in your body at all times.    EMERGENCIES  ·Please have  someone care for you at least 24-48 hours after the surgery  ·A small amount of red-tinged drainage on your dressings can be normal.  If the drainage soaks your dressings, continues to expand, is foul-smelling, or your incisions are very red please call the office.  ·If you develop a fever (>101.5°) or chills please call the office.   ·If you experience leg or calf pain, leg swelling, chest pain or difficulty breathing please call the clinic, or after hours go to the emergency room.    FOLLOW-UP CARE  ·If not already scheduled, please call the office to schedule a follow-up appointment for splint removal 10-14 days postoperatively.     ·In most cases you will be converted to a removable splint at that time and begin physical therapy.     ·Do not do any weight-lifting or strengthening exercises without talking with your surgeon or occupational therapist.    IF YOU HAVE ANY QUESTIONS OR CONCERNS, PLEASE FEEL FREE TO CALL THE OFFICE.  [phone]  Notes: Keep splint clean dry and intact.  I will remove this at follow-up.

## 2024-02-23 DIAGNOSIS — N91.1 SECONDARY AMENORRHEA: ICD-10-CM

## 2024-02-23 RX ORDER — NORETHINDRONE ACETATE AND ETHINYL ESTRADIOL AND FERROUS FUMARATE 1.5-30(21)
1 KIT ORAL DAILY
Qty: 84 TABLET | Refills: 0 | Status: SHIPPED | OUTPATIENT
Start: 2024-02-23 | End: 2024-05-21

## 2024-02-28 ENCOUNTER — HOSPITAL ENCOUNTER (OUTPATIENT)
Dept: RADIOLOGY | Facility: CLINIC | Age: 29
Discharge: HOME | End: 2024-02-28
Payer: COMMERCIAL

## 2024-02-28 ENCOUNTER — OFFICE VISIT (OUTPATIENT)
Dept: ORTHOPEDIC SURGERY | Facility: CLINIC | Age: 29
End: 2024-02-28
Payer: COMMERCIAL

## 2024-02-28 DIAGNOSIS — M25.521 RIGHT ELBOW PAIN: ICD-10-CM

## 2024-02-28 PROCEDURE — 3008F BODY MASS INDEX DOCD: CPT | Performed by: STUDENT IN AN ORGANIZED HEALTH CARE EDUCATION/TRAINING PROGRAM

## 2024-02-28 PROCEDURE — 73080 X-RAY EXAM OF ELBOW: CPT | Mod: RT

## 2024-02-28 PROCEDURE — 73080 X-RAY EXAM OF ELBOW: CPT | Mod: RIGHT SIDE | Performed by: RADIOLOGY

## 2024-02-28 PROCEDURE — 1036F TOBACCO NON-USER: CPT | Performed by: STUDENT IN AN ORGANIZED HEALTH CARE EDUCATION/TRAINING PROGRAM

## 2024-02-28 PROCEDURE — 99024 POSTOP FOLLOW-UP VISIT: CPT | Performed by: STUDENT IN AN ORGANIZED HEALTH CARE EDUCATION/TRAINING PROGRAM

## 2024-02-28 ASSESSMENT — PAIN - FUNCTIONAL ASSESSMENT: PAIN_FUNCTIONAL_ASSESSMENT: NO/DENIES PAIN

## 2024-02-28 NOTE — PROGRESS NOTES
Chief Complaint   Patient presents with    Right Elbow - Post-op     ORIF Rt Olecranon 2/13/24; Xrays Today       History of Present Illness   Patient presents today for incision check s/p ORIF olecranon fracture.   Pain is improving s/p ORIF. No new numbness or tingling. No chest pain or shortness of breath. No calf pain.    Doing well no issues.        Review of Systems   GENERAL: Negative  GI: Negative  MUSCULOSKELETAL: See HPI  SKIN: Negative  NEURO:  Negative     Physical Exam:  side: right upper extremity:  Incision healing well. No active drainage or discharge. No surrounding erythema  Swelling / ecchymosis noted  Intact flexion and extension of 1st IP joint and finger abduction  Sensation intact to light touch medial / ulnar and radial nerve distribution   Good cap refill     Imaging  FL fluoro images no charge  These images are not reportable by radiology and will not be interpreted   by  Radiologists.    Multiple views operative extremity: Status post open reduction internal fixation olecranon fracture no signs of hardware failure or loosening     Assessment   Patient s/p ORIF olecranon fracture 2 week follow up      Plan:  Patient doing well as expected.    Having no issues with immobilization at this time.    Continue strict nonweightbearing to the affected extremity.  Follow up in 4 weeks  XR at follow up 3 views right elbow    We discussed the importance of vitamins particularly the use of vitamin D to maximize nutritional environment for healing of fracture. Patient agrees to optimize nutrition and diet to provide favorable environment for healing.

## 2024-03-27 ENCOUNTER — OFFICE VISIT (OUTPATIENT)
Dept: ORTHOPEDIC SURGERY | Facility: CLINIC | Age: 29
End: 2024-03-27
Payer: COMMERCIAL

## 2024-03-27 ENCOUNTER — HOSPITAL ENCOUNTER (OUTPATIENT)
Dept: RADIOLOGY | Facility: CLINIC | Age: 29
Discharge: HOME | End: 2024-03-27
Payer: COMMERCIAL

## 2024-03-27 DIAGNOSIS — M25.521 RIGHT ELBOW PAIN: ICD-10-CM

## 2024-03-27 PROCEDURE — 3008F BODY MASS INDEX DOCD: CPT | Performed by: STUDENT IN AN ORGANIZED HEALTH CARE EDUCATION/TRAINING PROGRAM

## 2024-03-27 PROCEDURE — 73080 X-RAY EXAM OF ELBOW: CPT | Mod: RIGHT SIDE | Performed by: STUDENT IN AN ORGANIZED HEALTH CARE EDUCATION/TRAINING PROGRAM

## 2024-03-27 PROCEDURE — 73080 X-RAY EXAM OF ELBOW: CPT | Mod: RT

## 2024-03-27 PROCEDURE — 1036F TOBACCO NON-USER: CPT | Performed by: STUDENT IN AN ORGANIZED HEALTH CARE EDUCATION/TRAINING PROGRAM

## 2024-03-27 PROCEDURE — 99024 POSTOP FOLLOW-UP VISIT: CPT | Performed by: STUDENT IN AN ORGANIZED HEALTH CARE EDUCATION/TRAINING PROGRAM

## 2024-03-27 ASSESSMENT — PAIN - FUNCTIONAL ASSESSMENT: PAIN_FUNCTIONAL_ASSESSMENT: NO/DENIES PAIN

## 2024-03-27 NOTE — PROGRESS NOTES
Chief Complaint   Patient presents with    Right Elbow - Post-op     ORIF Rt Olecranon Fx 2/13/24; Xrays Today       History of Present Illness   Patient presents today for 6 week follow up s/p ORIF right olecranon.   Pain is improving s/p ORIF. No new numbness or tingling. No chest pain or shortness of breath.     Doing well no issues        Review of Systems   GENERAL: Negative  GI: Negative  MUSCULOSKELETAL: See HPI  SKIN: Negative  NEURO:  Negative     Physical Exam:  side: right upper extremity:  Incision well healed. No surrounding erythema  Swelling / ecchymosis noted  Intact flexion and extension of 1st IP joint and finger abduction  Sensation intact to light touch medial / ulnar and radial nerve distribution   Good cap refill     Imaging  XR elbow right 3+ views  Narrative: Interpreted By:  Fuentes Tam,   STUDY:  XR ELBOW RIGHT 3+ VIEWS;  2/28/2024 9:55 am      INDICATION:  Signs/Symptoms:pain.      COMPARISON:  None.      ACCESSION NUMBER(S):  [id number]      ORDERING CLINICIAN:  LIYA GRULLON      FINDINGS:  A long screw extends across a fracture of the olecranon. Fracture  fragments are in anatomic alignment. No dislocation. Joint effusion  is present. Skin staples posteriorly.      Impression: Post satisfactory internal fixation of right olecranon fracture.      MACRO:  None      Signed by: Fuentes Tam 2/29/2024 9:37 AM  Dictation workstation:   [id number]       Assessment   Patient s/p ORIF right olecranon fracture 6 week follow up      Plan:  Patient doing well as expected.  .    Continue strict nonweightbearing to the affected extremity.  Ok for gentle active and passive ROM  Discussed activities to avoid and using pain as a guide.  Follow up in 6 weeks  XR at follow up 3 views right elbow    We discussed the importance of vitamins particularly the use of vitamin D to maximize nutritional environment for healing of fracture. Patient agrees to optimize nutrition and diet to provide favorable  environment for healing.    X-ray findings reviewed today.  X-rays consistent with healing.  Discussed that she will likely have some residual posttraumatic arthritis as result of injury.    Will have her follow-up in 2 months time for discussion of return to sport.

## 2024-04-02 PROBLEM — R06.02 SOB (SHORTNESS OF BREATH) ON EXERTION: Status: ACTIVE | Noted: 2024-04-02

## 2024-04-02 ASSESSMENT — ENCOUNTER SYMPTOMS
BRUISES/BLEEDS EASILY: 0
TROUBLE SWALLOWING: 0
HALLUCINATIONS: 0
FREQUENCY: 0
ADENOPATHY: 0
COUGH: 0
FATIGUE: 0
DYSURIA: 0
SORE THROAT: 0
CONFUSION: 0
ABDOMINAL PAIN: 0
EYE REDNESS: 0
APPETITE CHANGE: 0
CHILLS: 0
NECK PAIN: 0
POLYDIPSIA: 0
POLYPHAGIA: 0
HEMATURIA: 0
FEVER: 0
UNEXPECTED WEIGHT CHANGE: 0
VOMITING: 0
DIZZINESS: 0
BLOOD IN STOOL: 0
EYE PAIN: 0
WHEEZING: 0
HEADACHES: 0
CONSTITUTIONAL NEGATIVE: 1
BACK PAIN: 0
SHORTNESS OF BREATH: 0
EYE DISCHARGE: 0
PALPITATIONS: 0
WOUND: 0

## 2024-04-02 NOTE — PROGRESS NOTES
Subjective   Patient ID: Liz Arango is a 28 y.o. female who presents for Annual Exam.      Is pt fasting? No   Does pt see any providers other than care team below:   maria isabel Cheatham lundgren    Any forms to fill out? No   How is pt doing with the energy crashes? Better   Pt is off fluoxetine 5mon  Last albuterol inh use- beginning of feb.   Any other questions or concerns that pt wants to discuss today?  No       Patient Care Team     Relationship Specialty Notifications Start End   Anna Pineda MD Consulting Physician Pediatric Endocrinology Admissions 3/27/23     Address:  [address] [place] [address] Pediatrics-Endocrinology/Metabolism [facility] [id number]       HPI  Last labs-1/2024 b12 nl, vit d nl, tsh nl, cbc nl, cmp nl, neg for mono  Due for labs- lipid    Cholesterol   Date Value Ref Range Status   03/25/2022 163 0 - 199 mg/dL Final     Comment:     .      AGE      DESIRABLE   BORDERLINE HIGH   HIGH     0-19 Y     0 - 169       170 - 199     >/= 200    20-24 Y     0 - 189       190 - 224     >/= 225         >24 Y     0 - 199       200 - 239     >/= 240   **All ranges are based on fasting samples. Specific   therapeutic targets will vary based on patient-specific   cardiac risk.  .   Pediatric guidelines reference:Pediatrics 2011, 128(S5).   Adult guidelines reference: NCEP ATPIII Guidelines,     JENNIFER 2001, 258:2486-97  .   Venipuncture immediately after or during the    administration of Metamizole may lead to falsely   low results. Testing should be performed immediately   prior to Metamizole dosing.       Triglycerides   Date Value Ref Range Status   03/25/2022 110 0 - 149 mg/dL Final     Comment:     .      AGE      DESIRABLE   BORDERLINE HIGH   HIGH     VERY HIGH   0 D-90 D    19 - 174         ----         ----        ----  91 D- 9 Y     0 -  74        75 -  99     >/= 100      ----    10-19 Y     0 -  89        90 - 129     >/= 130      ----    20-24 Y     0 - 114       115 - 149     >/= 150      ----    "      >24 Y     0 - 149       150 - 199    200- 499    >/= 500  .   Venipuncture immediately after or during the    administration of Metamizole may lead to falsely   low results. Testing should be performed immediately   prior to Metamizole dosing.       HDL   Date Value Ref Range Status   03/25/2022 42.8 mg/dL Final     Comment:     .      AGE      VERY LOW   LOW     NORMAL    HIGH       0-19 Y       < 35   < 40     40-45     ----    20-24 Y       ----   < 40       >45     ----      >24 Y       ----   < 40     40-60      >60  .       No results found for: \"LDL\"  Thyroid Stimulating Hormone   Date Value Ref Range Status   01/29/2024 0.86 0.44 - 3.98 mIU/L Final     No results found for: \"A1C\"  No components found for: \"POCA1C\"  No results found for: \"ALBUR\"  No components found for: \"POCALBUR\"      Other concerns: none    bps at home- none    ER/urgicare visits in the last year- 2/2024 rt elbow pain  Hospitalizations in the last year- none      last Pap- 1/18/21; due 1/2024  H/o abn pap-1/2021 squamous cell-ascus-hpv neg    FH ovarian, endometrial, cervical, uterine ca-mat gr aunt ovarian ca    Current birth control method-bcp  No change in contraception desired    FH br ca-none      FH colon ca-none      Exercise- active-3d a wk exercise; yoga 2d  Diet-3 meals with snack in afternoon   Body mass index is 29.19 kg/m².    last eye dr appt- contacts and glasses 1yr ago  No vision issues    last dental appt- last mon    BMs-regular  Sleep-able to fall asleep and stay asleep; no snoring or apnea  no cp, swelling, sob, abd pain, n/v/d/c, blood in stool or black stools  STI testing including hiv (age 15-65) and hep c screening (18-79)-declines      Immunization History   Administered Date(s) Administered    Moderna SARS-CoV-2 Vaccination 08/11/2021, 09/08/2021    Tdap vaccine, age 7 year and older (BOOSTRIX, ADACEL) 06/27/2021         fractures in lifetime-rt elbow  [place] in [place] [place]    FH heart " attack, heart surgery-none  FH stroke-none    The ASCVD Risk score (Justin DK, et al., 2019) failed to calculate for the following reasons:    The 2019 ASCVD risk score is only valid for ages 40 to 79  Coronary Artery Calcium score:  This test is recommended for men 45 or older and women 55 or older without a history of heart disease and have 1 risk factor (high LDL cholesterol, low HDL cholesterol, high blood pressure, smoker (current or past), type 2 diabetes, IBD, lupus, RA, ankylosing spondylitis, psoriasis or family history of  heart disease <55yrs in dad, brother or child or <65yrs in mom, sister, or child.)       Review of Systems   Constitutional: Negative.  Negative for appetite change, chills, fatigue, fever and unexpected weight change.   HENT:  Negative for congestion, ear pain, sore throat and trouble swallowing.    Eyes:  Negative for pain, discharge and redness.   Respiratory:  Negative for cough, shortness of breath and wheezing.    Cardiovascular:  Negative for chest pain and palpitations.   Gastrointestinal:  Negative for abdominal pain, blood in stool and vomiting.   Endocrine: Negative for polydipsia, polyphagia and polyuria.   Genitourinary:  Negative for dysuria, frequency, hematuria and urgency.   Musculoskeletal:  Negative for back pain and neck pain.   Skin:  Negative for rash and wound.   Allergic/Immunologic: Negative for immunocompromised state.   Neurological:  Negative for dizziness, syncope and headaches.   Hematological:  Negative for adenopathy. Does not bruise/bleed easily.   Psychiatric/Behavioral:  Negative for confusion and hallucinations.        Objective   Visit Vitals  /80   Pulse 99   Temp 36.3 °C (97.3 °F)      BP Readings from Last 3 Encounters:   04/03/24 121/80   02/13/24 117/72   02/09/24 102/67     Wt Readings from Last 3 Encounters:   04/03/24 74.8 kg (164 lb 12.8 oz)   02/13/24 76.1 kg (167 lb 12.3 oz)   02/09/24 76.2 kg (168 lb)           Physical  Exam  Constitutional:       General: She is not in acute distress.     Appearance: Normal appearance. She is not ill-appearing.   HENT:      Head: Normocephalic.      Right Ear: Tympanic membrane, ear canal and external ear normal.      Left Ear: Tympanic membrane, ear canal and external ear normal.      Nose: Nose normal.      Mouth/Throat:      Mouth: Mucous membranes are moist.      Pharynx: Oropharynx is clear.   Eyes:      Extraocular Movements: Extraocular movements intact.      Conjunctiva/sclera: Conjunctivae normal.      Pupils: Pupils are equal, round, and reactive to light.   Cardiovascular:      Rate and Rhythm: Normal rate and regular rhythm.      Heart sounds: Normal heart sounds. No murmur heard.  Pulmonary:      Effort: Pulmonary effort is normal. No respiratory distress.      Breath sounds: Normal breath sounds. No wheezing, rhonchi or rales.   Abdominal:      General: Bowel sounds are normal.      Palpations: Abdomen is soft. There is no mass.      Tenderness: There is no abdominal tenderness.   Musculoskeletal:         General: No swelling or tenderness. Normal range of motion.      Cervical back: Normal range of motion and neck supple.      Right lower leg: No edema.      Left lower leg: No edema.   Skin:     General: Skin is warm.      Findings: No rash.   Neurological:      General: No focal deficit present.      Mental Status: She is alert and oriented to person, place, and time.      Cranial Nerves: No cranial nerve deficit.      Motor: No weakness.   Psychiatric:         Mood and Affect: Mood normal.         Behavior: Behavior normal.       Assessment/Plan   Diagnoses and all orders for this visit:  Routine general medical examination at a health care facility  -     Lipid Panel; Future  BMI 29.0-29.9,adult  Other orders  -     Follow Up In Primary Care - Health Maintenance  -     Follow Up In Primary Care - Health Maintenance; Future

## 2024-04-03 ENCOUNTER — OFFICE VISIT (OUTPATIENT)
Dept: PRIMARY CARE | Facility: CLINIC | Age: 29
End: 2024-04-03
Payer: COMMERCIAL

## 2024-04-03 VITALS
OXYGEN SATURATION: 96 % | WEIGHT: 164.8 LBS | SYSTOLIC BLOOD PRESSURE: 121 MMHG | BODY MASS INDEX: 29.2 KG/M2 | HEIGHT: 63 IN | TEMPERATURE: 97.3 F | DIASTOLIC BLOOD PRESSURE: 80 MMHG | HEART RATE: 99 BPM

## 2024-04-03 DIAGNOSIS — Z00.00 ROUTINE GENERAL MEDICAL EXAMINATION AT A HEALTH CARE FACILITY: Primary | ICD-10-CM

## 2024-04-03 PROCEDURE — 99395 PREV VISIT EST AGE 18-39: CPT | Performed by: NURSE PRACTITIONER

## 2024-04-03 PROCEDURE — 3008F BODY MASS INDEX DOCD: CPT | Performed by: NURSE PRACTITIONER

## 2024-04-03 PROCEDURE — 1036F TOBACCO NON-USER: CPT | Performed by: NURSE PRACTITIONER

## 2024-04-03 ASSESSMENT — PATIENT HEALTH QUESTIONNAIRE - PHQ9
SUM OF ALL RESPONSES TO PHQ9 QUESTIONS 1 AND 2: 0
2. FEELING DOWN, DEPRESSED OR HOPELESS: NOT AT ALL
1. LITTLE INTEREST OR PLEASURE IN DOING THINGS: NOT AT ALL

## 2024-04-03 NOTE — PATIENT INSTRUCTIONS
Contact LAUREN Cheatham to see when due for next pap      Handouts given to pt:  physical handout        Need records from:    I recommend signing up for MyChart.    Labs:    You can use the lab in our building when fasting. The hrs are: Monday-Friday, 7 a.m. - 5 p.m., Saturday 8 a.m. - 12 noon.   No appt needed, BUT YOU DO NEED THE PAPER ORDER.    Fasting is no food, drink, gum or mints other than water for 12 hrs.   Results will be back in 2-3 business days for most labs. It is always recommended for any orders (labs, xrays, ultrasounds,MRI, ct scan, procedures etc) to check with your insurance provider for expected costs or expenses to you.       You will get your results via phone from my medical assistant if you do not have MyChart.  OR  You will get your results via [employer]    If a result is urgent, I will call to speak to you.    Vaccines:  Up to date    General recommendations:  Exercise-cardio 4-5d/wk 30min each day  Diet-Breakfast-toast (my favorite Nat Bryant Delighful Multigrain or Vimal's Killer Bread Good Seed thin-sliced)/bagel/English muffin-whole wheat flour as a 1st ingredient or cereal/oatmeal/granola bar-fiber 4g or more or protein like eggs or peanut butter; optional veggies  Lunch-protein, 1/2c carb or 2 slices bread, veg 1c  Dinner-protein, fist sized carb, veg 1c  Fruit 2 a day  Dairy 2 a day-milk, soy milk, almond milk, cheese, yogurt, cottage cheese  Snacks-Protein-hard boiled egg, nuts (walnuts/almonds/pecans/pistachios 1/4c), hummus, beef/deer jerky or meat sticks; vegetable, fruit, dairy-milk(1%, skim, almond, soy)/cheese (not a lot of cheddar)/yogurt (Greek is best-my favorite Dannon Fruit on the Bottom Greek)/cottage cheese 2%; triscuits/ popcorn/wheat thins have a lot of fiber; follow serving size on bag/box/container  increase water  Limit alcohol to 1 drink per day for women and 2 drinks per day for men (1 drink=12oz beer or 5oz wine or 1 1/2oz liquor)  Calcium: 500mg 1 twice a day if age  50 and younger and 600mg 1 twice a day if over age 50 (calcium citrate can be taken without food)  Vitamin D: 800-5000 IU/day  Limit salt to <2300mg a day if age 50 and under and <1500mg a day if over age 50/have high bp or diabetes or kidney disease  Recommend folate for childbearing age women 0.4mg per day (can be found in a multivitamin)  Recommend 18mg/dL of iron a day if age 50 and under and 8mg/dL a day if over age 50; take on an empty stomach at bedtime  Use sunscreen   Wear seatbelt  Recommend safe sex practices: using condoms everytime you have sex, discuss with a new partner about their past partners/history of STDs/drug use, avoid drinking alcohol or using drugs as this increases the chance that you will participate in high-risk sex, for oral sex help protect your mouth by having your partner use a condom (male or female), women should not douche after sex, be aware of your partner's body and your body-look for signs of a sore, blister, rash, or discharge, and have regular exams and periodic tests for STDs.  No distracted driving  No driving when under influence of substances  Wear a seatbelt  Eye dr every 1-2yrs  Dentist every 6-12 mon  Tetanus shot every 10yrs  Recommend flu vaccine in the fall  Appt in  1 year for physical      I will communicate with you via [employer] regarding messages and results. If you need help with this, you can call the support line at [phone].    IT WAS A PLEASURE TO SEE YOU TODAY. THANK YOU FOR CHOOSING US FOR YOUR HEALTHCARE NEEDS.

## 2024-05-16 DIAGNOSIS — N91.1 SECONDARY AMENORRHEA: ICD-10-CM

## 2024-05-16 NOTE — TELEPHONE ENCOUNTER
Sent MyChart message & LVMTCB to schedule annual exam for OCP refills. Has not been seen since March 2024.

## 2024-05-21 RX ORDER — NORETHINDRONE ACETATE AND ETHINYL ESTRADIOL .02; 1 MG/1; MG/1
1 TABLET ORAL DAILY
COMMUNITY
Start: 2019-11-12

## 2024-05-21 RX ORDER — ALBUTEROL SULFATE 90 UG/1
2 AEROSOL, METERED RESPIRATORY (INHALATION) EVERY 6 HOURS
COMMUNITY
Start: 2022-03-24

## 2024-05-21 RX ORDER — NORETHINDRONE ACETATE AND ETHINYL ESTRADIOL AND FERROUS FUMARATE 1.5-30(21)
1 KIT ORAL DAILY
Qty: 84 TABLET | Refills: 0 | Status: SHIPPED | OUTPATIENT
Start: 2024-05-21

## 2024-05-22 ENCOUNTER — HOSPITAL ENCOUNTER (OUTPATIENT)
Dept: RADIOLOGY | Facility: CLINIC | Age: 29
Discharge: HOME | End: 2024-05-22
Payer: COMMERCIAL

## 2024-05-22 ENCOUNTER — OFFICE VISIT (OUTPATIENT)
Dept: ORTHOPEDIC SURGERY | Facility: CLINIC | Age: 29
End: 2024-05-22
Payer: COMMERCIAL

## 2024-05-22 DIAGNOSIS — S42.401A ELBOW FRACTURE, RIGHT, CLOSED, INITIAL ENCOUNTER: ICD-10-CM

## 2024-05-22 PROCEDURE — 73080 X-RAY EXAM OF ELBOW: CPT | Mod: RIGHT SIDE | Performed by: RADIOLOGY

## 2024-05-22 PROCEDURE — 99213 OFFICE O/P EST LOW 20 MIN: CPT | Performed by: STUDENT IN AN ORGANIZED HEALTH CARE EDUCATION/TRAINING PROGRAM

## 2024-05-22 PROCEDURE — 73080 X-RAY EXAM OF ELBOW: CPT | Mod: RT

## 2024-05-22 PROCEDURE — 1036F TOBACCO NON-USER: CPT | Performed by: STUDENT IN AN ORGANIZED HEALTH CARE EDUCATION/TRAINING PROGRAM

## 2024-05-22 PROCEDURE — 3008F BODY MASS INDEX DOCD: CPT | Performed by: STUDENT IN AN ORGANIZED HEALTH CARE EDUCATION/TRAINING PROGRAM

## 2024-05-22 ASSESSMENT — PAIN - FUNCTIONAL ASSESSMENT: PAIN_FUNCTIONAL_ASSESSMENT: NO/DENIES PAIN

## 2024-05-22 NOTE — PROGRESS NOTES
Chief Complaint   Patient presents with    Right Elbow - Post-op     ORIF Rt Olecranon Fx 2/13/24; Xrays Today       History of Present Illness   Patient presents today for 3 months follow up s/p ORIF right olecranon.   Pain is improving s/p ORIF. No new numbness or tingling. No chest pain or shortness of breath.  Patient states overall she is feeling much better.  Has been working with a  and gaining strength.  Some occasional soreness especially after activities throughout the day.  Endorsing some triceps weakness but she is working with her  and improving this.           Review of Systems   GENERAL: Negative  GI: Negative  MUSCULOSKELETAL: See HPI  SKIN: Negative  NEURO:  Negative     Physical Exam:  side: right upper extremity:  ROM: Nearly equal in comparison to the other side  Incision well healed. No surrounding erythema  Mild if any swelling / ecchymosis noted  Intact flexion and extension of 1st IP joint and finger abduction  Sensation intact to light touch medial / ulnar and radial nerve distribution   Good cap refill  Equal strength to elbow flexion  Slightly decreased right elbow strength to extension against resistance     Imaging  XR elbow right 3+ views  Narrative: Interpreted By:  Jamal Mccoy,   STUDY:  XR ELBOW RIGHT 3+ VIEWS; ;  3/27/2024 9:54 am      INDICATION:  Signs/Symptoms:Pain.      COMPARISON:  02/28/2024      ACCESSION NUMBER(S):  [id number]      ORDERING CLINICIAN:  LIYA GRULLON      FINDINGS:  Three views of the right elbow.      Postsurgical changes status post olecranon process fracture fixation  with a cannulated screw. No hardware complication. The soft tissues  are unremarkable.      Impression: Postsurgical changes status post olecranon process fracture fixation.  No hardware complication.      MACRO:  None      Signed by: Jamal Mccoy 3/29/2024 6:04 PM  Dictation workstation:   [id number]       Assessment   Patient s/p ORIF left olecranon fracture 3 Month  follow up      Plan:  Patient progressing appropriately  No weightbearing restrictions at this point time  Activities as tolerated weight-bear as tolerated using pain as a guide  Discussed that full recovery can take up to a year's time  Work on active and passive range of motion of the affected extremity.  Discussed potential hardware removal If appropriate  Discussed continuation working with  and building muscle strength  Patient will follow-up as needed if pain returns/worsens    We discussed the importance of vitamins particularly the use of vitamin D to maximize nutritional environment for healing of fracture. Patient agrees to optimize nutrition and diet to provide favorable environment for healing.    Gino Ferrara PA-C    In a face to face encounter, I evaluated the patient and performed a physical examination, discussed pertinent diagnostic studies if indicated and discussed diagnosis and management strategies with both the patient and physician assistant / nurse practitioner.  I reviewed the PA/NP's note and agree with the documented findings and plan of care.       Azar Coles III, MD

## 2024-06-17 ENCOUNTER — APPOINTMENT (OUTPATIENT)
Dept: OBSTETRICS AND GYNECOLOGY | Facility: CLINIC | Age: 29
End: 2024-06-17
Payer: COMMERCIAL

## 2024-06-17 VITALS
HEIGHT: 63 IN | WEIGHT: 163.25 LBS | SYSTOLIC BLOOD PRESSURE: 120 MMHG | BODY MASS INDEX: 28.93 KG/M2 | DIASTOLIC BLOOD PRESSURE: 84 MMHG

## 2024-06-17 DIAGNOSIS — R87.610 ASCUS OF CERVIX WITH NEGATIVE HIGH RISK HPV: ICD-10-CM

## 2024-06-17 DIAGNOSIS — N91.1 SECONDARY AMENORRHEA: ICD-10-CM

## 2024-06-17 PROCEDURE — 3008F BODY MASS INDEX DOCD: CPT | Performed by: ADVANCED PRACTICE MIDWIFE

## 2024-06-17 PROCEDURE — 99395 PREV VISIT EST AGE 18-39: CPT | Performed by: ADVANCED PRACTICE MIDWIFE

## 2024-06-17 RX ORDER — NORETHINDRONE ACETATE AND ETHINYL ESTRADIOL 1.5-30(21)
1 KIT ORAL DAILY
Qty: 84 TABLET | Refills: 3 | Status: SHIPPED | OUTPATIENT
Start: 2024-06-17 | End: 2025-06-17

## 2024-06-17 NOTE — PROGRESS NOTES
"Subjective   Liz Arango is a 29 y.o. female who is here for a routine well woman exam.     Concerns today:  None    Patient's last menstrual period was 2024.   Periods are regular every 28-30 days, lasting 3 days.   Dysmenorrhea:none.   Cyclic symptoms include none.     Sexual Activity: sexually active, male partners; Patient reports 1 partners in the last 12 months.  Pain with intercourse? No   Loss of desire? Yes   Able to have an orgasm? Yes     History of prior STI: none    Current contraception: OCP (estrogen/progesterone)    Last pap:   History of abnormal Pap smear: yes - , ASCUS/HPV neg  Treatment for cervical dysplasia: no  Received HPV vaccine series?: Yes    Family history of breast cancer or ovarian cancer: yes - M great aunt breast cancer, M great aunt ovarian    Menstrual History:  OB History          0    Para   0    Term   0       0    AB   0    Living   0         SAB   0    IAB   0    Ectopic   0    Multiple   0    Live Births   0                Menarche age: 14  Patient's last menstrual period was 2024.     Objective   /84   Ht 1.6 m (5' 3\")   Wt 74 kg (163 lb 4 oz)   LMP 2024   BMI 28.92 kg/m²     Physical Exam  Constitutional:       Appearance: Normal appearance.   HENT:      Head: Normocephalic.      Nose: Nose normal.      Mouth/Throat:      Mouth: Mucous membranes are moist.      Pharynx: Oropharynx is clear.   Eyes:      Pupils: Pupils are equal, round, and reactive to light.   Cardiovascular:      Rate and Rhythm: Normal rate and regular rhythm.   Pulmonary:      Effort: Pulmonary effort is normal.      Breath sounds: Normal breath sounds.   Chest:   Breasts:     Right: Normal. No mass, nipple discharge, skin change or tenderness.      Left: Normal. No mass, nipple discharge, skin change or tenderness.   Abdominal:      General: Abdomen is flat.      Palpations: Abdomen is soft.   Genitourinary:     General: Normal vulva.      Vagina: " Normal.      Cervix: Normal.   Musculoskeletal:         General: Normal range of motion.      Cervical back: Normal range of motion and neck supple.   Skin:     General: Skin is warm and dry.   Neurological:      Mental Status: She is alert.   Psychiatric:         Mood and Affect: Mood normal.         Behavior: Behavior normal.          Assessment/Plan   Normal well woman exam  Continue healthy lifestyle habits  Consider taking a multivitamin daily  Continue recommended women's health screenings  Pap collected, if normal, repeat in 3 yrs  Birth control surveillance  Doing well on Celeste  (21/7)  No contraindications to use  Refill x 1 year     Return to care for annual exam or sooner as needed.    SHARIF Wilde-GIFTY

## 2024-06-27 DIAGNOSIS — L30.9 DERMATITIS: Primary | ICD-10-CM

## 2024-06-27 RX ORDER — CLOTRIMAZOLE AND BETAMETHASONE DIPROPIONATE 10; .64 MG/G; MG/G
1 CREAM TOPICAL 2 TIMES DAILY
Qty: 45 G | Refills: 0 | Status: SHIPPED | OUTPATIENT
Start: 2024-06-27 | End: 2024-08-26

## 2024-07-02 LAB
CYTOLOGY CMNT CVX/VAG CYTO-IMP: NORMAL
LAB AP CONTRACEPTIVE HISTORY: NORMAL
LAB AP HPV GENOTYPE QUESTION: NO
LAB AP HPV HR: NORMAL
LAB AP PREVIOUS ABNORMAL HISTORY: NORMAL
LABORATORY COMMENT REPORT: NORMAL
PATH REPORT.TOTAL CANCER: NORMAL

## 2024-08-09 DIAGNOSIS — N91.1 SECONDARY AMENORRHEA: ICD-10-CM

## 2024-08-12 RX ORDER — NORETHINDRONE ACETATE AND ETHINYL ESTRADIOL AND FERROUS FUMARATE 1.5-30(21)
1 KIT ORAL DAILY
Qty: 84 TABLET | Refills: 3 | Status: SHIPPED | OUTPATIENT
Start: 2024-08-12

## 2024-09-23 ENCOUNTER — HOSPITAL ENCOUNTER (OUTPATIENT)
Dept: RADIOLOGY | Facility: CLINIC | Age: 29
Discharge: HOME | End: 2024-09-23
Payer: COMMERCIAL

## 2024-09-23 ENCOUNTER — OFFICE VISIT (OUTPATIENT)
Dept: ORTHOPEDIC SURGERY | Facility: CLINIC | Age: 29
End: 2024-09-23
Payer: COMMERCIAL

## 2024-09-23 DIAGNOSIS — S52.021A CLOSED OLECRANON FRACTURE, RIGHT, INITIAL ENCOUNTER: ICD-10-CM

## 2024-09-23 DIAGNOSIS — Z96.9 RETAINED ORTHOPEDIC HARDWARE: ICD-10-CM

## 2024-09-23 DIAGNOSIS — M25.521 RIGHT ELBOW PAIN: ICD-10-CM

## 2024-09-23 PROCEDURE — 99213 OFFICE O/P EST LOW 20 MIN: CPT | Performed by: STUDENT IN AN ORGANIZED HEALTH CARE EDUCATION/TRAINING PROGRAM

## 2024-09-23 PROCEDURE — 73080 X-RAY EXAM OF ELBOW: CPT | Mod: RT

## 2024-09-23 PROCEDURE — 73080 X-RAY EXAM OF ELBOW: CPT | Mod: RIGHT SIDE | Performed by: STUDENT IN AN ORGANIZED HEALTH CARE EDUCATION/TRAINING PROGRAM

## 2024-09-23 RX ORDER — METHYLPREDNISOLONE 4 MG/1
TABLET ORAL
Qty: 21 TABLET | Refills: 0 | Status: SHIPPED | OUTPATIENT
Start: 2024-09-23 | End: 2024-09-30

## 2024-09-23 NOTE — PROGRESS NOTES
"    Chief Complaint   Patient presents with    Right Elbow - Follow-up     Having pain  ORIF Rt Olecranon Fx 2/13/24       HPI  Patient presents today for follow up of her right elbow.  Patient is status post open reduction intra fixation olecranon fracture 2/13/2024 had unremarkable postoperative course.  He has been returned to activities to include biking and working out and has been having some worsening elbow pain ever since this occurred she has some tenderness palpation particularly about the olecranon tip.  Has been having pain with particularly resisted elbow extension type activities.  Presents today for orthopedic evaluation and treatment      Past Medical History:   Diagnosis Date    Anxiety     Asthma ([facility])        Medication Documentation Review Audit       Reviewed by ANGIE Wilde (Midwife) on 06/17/24 at 1052      Medication Order Taking? Sig Documenting Provider Last Dose Status   albuterol 90 mcg/actuation inhaler [id number] No Inhale 2 puffs every 6 hours if needed for wheezing. KULDIP Stack Taking Active     Discontinued 06/17/24 1037   Celeste Fe 1.5/30, 28, 1.5 mg-30 mcg (21)/75 mg (7) tablet [id number]  TAKE 1 TABLET DAILY AGNIE Wilde  Active     Discontinued 06/17/24 1036                    Allergies   Allergen Reactions    Coconut Other     \"My throat gets itchy\" per pt     Pollen Extracts Unknown       Social History     Socioeconomic History    Marital status:      Spouse name: Not on file    Number of children: Not on file    Years of education: Not on file    Highest education level: Not on file   Occupational History    Occupation:    Tobacco Use    Smoking status: Never    Smokeless tobacco: Never   Vaping Use    Vaping status: Never Used   Substance and Sexual Activity    Alcohol use: Yes     Comment: pt drinks 2-3 mix drinks a week    Drug use: Never    Sexual activity: Not on file   Other Topics Concern    Not on " file   Social History Narrative    Not on file     Social Determinants of Health     Financial Resource Strain: Not on file   Food Insecurity: Not on file   Transportation Needs: Not on file   Physical Activity: Not on file   Stress: Not on file   Social Connections: Not on file   Intimate Partner Violence: Not on file   Housing Stability: Not on file       Past Surgical History:   Procedure Laterality Date    ELBOW SURGERY Right     open reduction int fixation    OTHER SURGICAL HISTORY  03/24/2022    Riva tooth extraction    US ASPIRATION INJECTION INTERMEDIATE JOINT  12/01/2021    US ASPIRATION INJECTION INTERMEDIATE JOINT 12/1/2021 [facility]    US ASPIRATION INJECTION INTERMEDIATE JOINT  01/03/2022    US ASPIRATION INJECTION INTERMEDIATE JOINT 1/3/2022 [facility]          Review of Systems   GENERAL: Negative  CV: NEGATIVE  RESPIRATORY: Negative  GI: Negative  MUSCULOSKELETAL: See HPI  SKIN: Negative  NEURO:  Negative     Physical Exam:  General: No acute distress alert and orient x3, alert and cooperative  HEENT: Normocephalic atraumatic.  Pupils round and reactive.  Trachea midline  Cardiovascular: Regular rate and rhythm.  Respiratory: Bilateral chest rise.  Breathing unlabored.  Abdomen: Nontender nondistended no rebounding.  See formal musculoskeletal below:    Affected Extremity:  Right elbow: Exquisite tenderness palpation screw head about olecranon  Skin healthy and intact prior incision well-healed  No gross swelling or ecchymosis     No tenderness to palpation over the olecranon or radial head  No tenderness to palpation over the lateral epicondyle  No tenderness to palpation over the medial epicondyle  Full flexion, extension, pronation/supination  No pain with resisted wrist extension or supination  No pain with resisted wrist flexion or pronation  Negative hook test  Neurovascular exam normal distally    Diagnostics:  X-rays collected in clinic today indicate a healed olecranon  fracture no obvious loosening.  Maintained joint space within the elbow articulation mild posttraumatic arthritis.    Procedures  Procedures     Assessment:  29-year-old female with retained orthopedic hardware, insertional triceps tendinitis likely secondary to retained orthopedic    Treatment plan:  Will order a CT scan for evaluation of fracture site prior to hard removal  I do think that this hardware is likely leading to her to have some pain and discomfort  We will discuss hardware removal after obtaining a CT  Discussed activities to avoid as well as importance of using pain as a guide  All of the patient's questions concerns answered

## 2024-10-07 ENCOUNTER — APPOINTMENT (OUTPATIENT)
Dept: RADIOLOGY | Facility: CLINIC | Age: 29
End: 2024-10-07
Payer: COMMERCIAL

## 2024-10-14 ENCOUNTER — APPOINTMENT (OUTPATIENT)
Dept: ORTHOPEDIC SURGERY | Facility: CLINIC | Age: 29
End: 2024-10-14
Payer: COMMERCIAL

## 2024-10-18 ENCOUNTER — APPOINTMENT (OUTPATIENT)
Dept: ORTHOPEDIC SURGERY | Facility: CLINIC | Age: 29
End: 2024-10-18
Payer: COMMERCIAL

## 2024-10-18 VITALS — BODY MASS INDEX: 28.88 KG/M2 | WEIGHT: 163 LBS | HEIGHT: 63 IN

## 2024-10-18 DIAGNOSIS — Z96.9 RETAINED ORTHOPEDIC HARDWARE: Primary | ICD-10-CM

## 2024-10-18 PROBLEM — T84.84XA PAIN DUE TO INTERNAL ORTHOPEDIC PROSTHETIC DEVICES, IMPLANTS AND GRAFTS, INITIAL ENCOUNTER (CMS-HCC): Status: ACTIVE | Noted: 2024-10-29

## 2024-10-18 PROCEDURE — 1036F TOBACCO NON-USER: CPT | Performed by: STUDENT IN AN ORGANIZED HEALTH CARE EDUCATION/TRAINING PROGRAM

## 2024-10-18 PROCEDURE — 3008F BODY MASS INDEX DOCD: CPT | Performed by: STUDENT IN AN ORGANIZED HEALTH CARE EDUCATION/TRAINING PROGRAM

## 2024-10-18 PROCEDURE — 99214 OFFICE O/P EST MOD 30 MIN: CPT | Performed by: STUDENT IN AN ORGANIZED HEALTH CARE EDUCATION/TRAINING PROGRAM

## 2024-10-18 NOTE — PROGRESS NOTES
"    Chief Complaint   Patient presents with    Right Elbow - Follow-up     Follow up CT scan, retained orthopedic hardware, insertional triceps tendinitis likely secondary to retained orthopedic hardware       HPI  Patient presents today for follow up of her right elbow.  Patient is status post open reduction intra fixation olecranon fracture 2024 had unremarkable postoperative course.  He has been returned to activities to include biking and working out and has been having some worsening elbow pain ever since this occurred she has some tenderness palpation particularly about the olecranon tip.  Has been having pain with particularly resisted elbow extension type activities.  Presents today for orthopedic evaluation and treatment    Presents today for discussion of CT results regarding her elbow.  Patient does wish to have hardware removed.    Past Medical History:   Diagnosis Date    Anxiety     Asthma        Medication Documentation Review Audit       Reviewed by Maria A Nascimento MA (Medical Assistant) on 10/18/24 at 0818      Medication Order Taking? Sig Documenting Provider Last Dose Status   albuterol 90 mcg/actuation inhaler [id number] No Inhale 2 puffs every 6 hours if needed for wheezing. SHARIF Stack-CNP Taking  24 2359   Celeste Fe 1.5/30, 28, 1.5 mg-30 mcg (21)/75 mg (7) tablet [id number]  TAKE 1 TABLET DAILY SHARIF Wilde-CNM  Active                    Allergies   Allergen Reactions    Coconut Other     \"My throat gets itchy\" per pt     Pollen Extracts Unknown       Social History     Socioeconomic History    Marital status:      Spouse name: Not on file    Number of children: Not on file    Years of education: Not on file    Highest education level: Not on file   Occupational History    Occupation:    Tobacco Use    Smoking status: Never    Smokeless tobacco: Never   Vaping Use    Vaping status: Never Used   Substance and Sexual Activity    " Alcohol use: Yes     Comment: pt drinks 2-3 mix drinks a week    Drug use: Never    Sexual activity: Not on file   Other Topics Concern    Not on file   Social History Narrative    Not on file     Social Drivers of Health     Financial Resource Strain: Not on file   Food Insecurity: Not on file   Transportation Needs: Not on file   Physical Activity: Not on file   Stress: Not on file   Social Connections: Not on file   Intimate Partner Violence: Not on file   Housing Stability: Not on file       Past Surgical History:   Procedure Laterality Date    ELBOW SURGERY Right     open reduction int fixation    OTHER SURGICAL HISTORY  03/24/2022    [place] tooth extraction    US ASPIRATION INJECTION INTERMEDIATE JOINT  12/01/2021    US ASPIRATION INJECTION INTERMEDIATE JOINT 12/1/2021 [facility]    US ASPIRATION INJECTION INTERMEDIATE JOINT  01/03/2022    US ASPIRATION INJECTION INTERMEDIATE JOINT 1/3/2022 [facility]          Review of Systems   GENERAL: Negative  CV: NEGATIVE  RESPIRATORY: Negative  GI: Negative  MUSCULOSKELETAL: See HPI  SKIN: Negative  NEURO:  Negative     Physical Exam:  General: No acute distress alert and orient x3, alert and cooperative  HEENT: Normocephalic atraumatic.  Pupils round and reactive.  Trachea midline  Cardiovascular: Regular rate and rhythm.  Respiratory: Bilateral chest rise.  Breathing unlabored.  Abdomen: Nontender nondistended no rebounding.  See formal musculoskeletal below:    Affected Extremity:  Right elbow: Exquisite tenderness palpation screw head about olecranon  Skin healthy and intact prior incision well-healed  No gross swelling or ecchymosis     No tenderness to palpation over the olecranon or radial head  No tenderness to palpation over the lateral epicondyle  No tenderness to palpation over the medial epicondyle  Full flexion, extension, pronation/supination  No pain with resisted wrist extension or supination  No pain with resisted wrist flexion or  pronation  Negative hook test  Neurovascular exam normal distally    Diagnostics:  X-rays collected in clinic today indicate a healed olecranon fracture no obvious loosening.  Maintained joint space within the elbow articulation mild posttraumatic arthritis.    CT elbow right wo IV contrast    Result Date: 10/8/2024  EXAMINATION: CT ELBOW RIGHT W/O CONTRASTPRO/RT   10/08/2024 08:38 AM CLINICAL HISTORY: Elbow pain, right ASSOCIATED DIAGNOSIS: Right elbow pain Retained orthopedic hardware Closed olecranon fracture, right, initial encounter ORDERING PROVIDER: LIYA HENRY TECHNOLOGISTS NOTE: COMPARISON: None TECHNIQUE: Thin axial images were obtained without intravenous contrast. Multiplanar reconstructions were obtained from the axial data. INTRA-PROCEDURE MEDS: FINDINGS: Soft tissue: No significant soft tissue swelling. No focal fluid collection. The visualized muscles are unremarkable. Bone/joint: Healed olecranon fracture status post ORIF. No abnormal perihardware lucency to suggest loosening. Mild posttraumatic degenerative changes of the ulnotrochlear joint. No evidence of acute osseous abnormality. No joint effusion. IMPRESSION: 1.  Healed olecranon fracture status post ORIF. No CT evidence of hardware failure or acute osseous abnormality. 2.  Mild posttraumatic degenerative changes of the ulnotrochlear joint. CT ELBOW RIGHT W/O CONTRAST MACRO: None    XR elbow right 3+ views    Result Date: 9/23/2024  Interpreted By:  Liya Grullon III, STUDY: XR ELBOW RIGHT 3+ VIEWS; ;  9/23/2024 1:26 pm   INDICATION: Signs/Symptoms:pain.   ,M25.521 Pain in right elbow   COMPARISON: None.   ACCESSION NUMBER(S): [id number]   ORDERING CLINICIAN: LIYA GRULLON   FINDINGS: Three views right elbow: Healed olecranon fracture in near anatomic alignment retained orthopedic hardware without jimnea-implant fracture or lucency. Maintained joint space within the elbow articulation mild posttraumatic degenerative changes about the  elbow articulation       Healed olecranon fracture     MACRO: None   Signed by: Azar Coles III 9/23/2024 2:03 PM Dictation workstation:   [id number]       Procedures  Procedures     Assessment:  29-year-old female with retained orthopedic hardware, insertional triceps tendinitis likely secondary to retained orthopedic hardware    Treatment plan:  CT findings discussed with patient in detail today, risk benefits alternative treatment discussed with her as well using shared deformities making patient wished to proceed with hardware removal about the right elbow.  She does have symptomatic hardware and would like to have this removed.  Discussed potential incomplete hardware removal, residual pain.  She does have mild posttraumatic arthritis as result of injury.  She is okay with these risks.  Does wish to proceed with hard removal.  Discussed risk of refracture.    Risk benefits and alternatives to treatment were discussed with patient in detail.  Particular risks of hardware removal include, but are not limited to, incomplete resolution of pain, partial removal of hardware, neurovascular compromise, infection, wound healing complications, need for revision surgery.  Using shared informed decision making patient does wish to proceed with hardware removal.  Discussed with patient that if there is incomplete removal of hardware and that I do not think retained remnant hardware is going to cause the patient issues we will leave this in place.  Patient demonstrates understanding and wished to proceed with surgery.  We will schedule surgery at earliest convenience.    The risks of surgery were discussed including but not limited to the risks of medications given for surgery, the risk of blood loss during and after surgery that can lead to the need for blood products in certain situations, infection, damage to normal structures that can lead to long term problems of pain or dysfunction, wound healing complications, the  possibility of nonunion/malunion of any osteotomies and late or chronic pain as a result of the surgical intervention.  In addition potentially life threatening complications that can occur at the time of surgery and after surgery were discussed including but not limited to deep vein thrombosis, pulmonary embolism, myocardial infarction, stroke and death.     Patient does have leftover pain medication from initial surgery we will take this if she needs to.  Do not need to provide another prescription today.

## 2024-10-22 LAB
NON-UH HIE A/G RATIO: 1.2
NON-UH HIE ALB: 3.8 G/DL (ref 3.4–5)
NON-UH HIE ALK PHOS: 62 UNIT/L (ref 45–117)
NON-UH HIE APTT PATIENT: 26.5 SECONDS (ref 27–38)
NON-UH HIE BASO COUNT: 0.07 X1000 (ref 0–0.2)
NON-UH HIE BASOS %: 1 %
NON-UH HIE BILIRUBIN, TOTAL: 1.4 MG/DL (ref 0.3–1.2)
NON-UH HIE BUN/CREAT RATIO: 15.6
NON-UH HIE BUN: 14 MG/DL (ref 9–23)
NON-UH HIE CALCIUM: 9.9 MG/DL (ref 8.7–10.4)
NON-UH HIE CALCULATED LDL CHOLESTEROL: 129 MG/DL (ref 60–130)
NON-UH HIE CALCULATED OSMOLALITY: 281 MOSM/KG (ref 275–295)
NON-UH HIE CHLORIDE: 106 MMOL/L (ref 98–107)
NON-UH HIE CHOLESTEROL: 204 MG/DL (ref 100–200)
NON-UH HIE CO2, VENOUS: 27 MMOL/L (ref 20–31)
NON-UH HIE CREATININE: 0.9 MG/DL (ref 0.5–0.8)
NON-UH HIE DIFF?: NO
NON-UH HIE EOS COUNT: 0.09 X1000 (ref 0–0.5)
NON-UH HIE EOSIN %: 1.3 %
NON-UH HIE GFR AA: >60
NON-UH HIE GLOBULIN: 3.2 G/DL
NON-UH HIE GLOMERULAR FILTRATION RATE: >60 ML/MIN/1.73M?
NON-UH HIE GLUCOSE: 88 MG/DL (ref 74–106)
NON-UH HIE GOT: 23 UNIT/L (ref 15–37)
NON-UH HIE GPT: 22 UNIT/L (ref 10–49)
NON-UH HIE HCT: 40.2 % (ref 36–46)
NON-UH HIE HDL CHOLESTEROL: 52 MG/DL (ref 40–60)
NON-UH HIE HGB: 13.8 G/DL (ref 12–16)
NON-UH HIE INR: 1
NON-UH HIE INSTR WBC: 6.9
NON-UH HIE K: 4.2 MMOL/L (ref 3.5–5.1)
NON-UH HIE LYMPH %: 44.9 %
NON-UH HIE LYMPH COUNT: 3.08 X1000 (ref 1.2–4.8)
NON-UH HIE MCH: 31.2 PG (ref 27–34)
NON-UH HIE MCHC: 34.2 G/DL (ref 32–37)
NON-UH HIE MCV: 91.2 FL (ref 80–100)
NON-UH HIE MONO %: 6.6 %
NON-UH HIE MONO COUNT: 0.45 X1000 (ref 0.1–1)
NON-UH HIE MPV: 9.6 FL (ref 7.4–10.4)
NON-UH HIE NA: 141 MMOL/L (ref 135–145)
NON-UH HIE NEUTROPHIL %: 46.2 %
NON-UH HIE NEUTROPHIL COUNT (ANC): 3.17 X1000 (ref 1.4–8.8)
NON-UH HIE NUCLEATED RBC: 0 /100WBC
NON-UH HIE PLATELET: 269 X10 (ref 150–450)
NON-UH HIE PROTIME PATIENT: 11.3 SECONDS (ref 9.8–12.8)
NON-UH HIE RBC: 4.4 X10 (ref 4.2–5.4)
NON-UH HIE RDW: 12.7 % (ref 11.5–14.5)
NON-UH HIE TOTAL CHOL/HDL CHOL RATIO: 3.9
NON-UH HIE TOTAL PROTEIN: 7 G/DL (ref 5.7–8.2)
NON-UH HIE TRIGLYCERIDES: 115 MG/DL (ref 30–150)
NON-UH HIE WBC: 6.9 X10 (ref 4.5–11)

## 2024-10-28 ENCOUNTER — ANESTHESIA EVENT (OUTPATIENT)
Dept: OPERATING ROOM | Facility: HOSPITAL | Age: 29
End: 2024-10-28
Payer: COMMERCIAL

## 2024-10-28 PROBLEM — E66.811 CLASS 1 OBESITY IN ADULT: Status: ACTIVE | Noted: 2024-10-28

## 2024-10-28 SDOH — HEALTH STABILITY: MENTAL HEALTH: CURRENT SMOKER: 0

## 2024-10-29 ENCOUNTER — APPOINTMENT (OUTPATIENT)
Dept: RADIOLOGY | Facility: HOSPITAL | Age: 29
End: 2024-10-29
Payer: COMMERCIAL

## 2024-10-29 ENCOUNTER — HOSPITAL ENCOUNTER (OUTPATIENT)
Facility: HOSPITAL | Age: 29
Setting detail: OUTPATIENT SURGERY
Discharge: HOME | End: 2024-10-29
Attending: STUDENT IN AN ORGANIZED HEALTH CARE EDUCATION/TRAINING PROGRAM | Admitting: STUDENT IN AN ORGANIZED HEALTH CARE EDUCATION/TRAINING PROGRAM
Payer: COMMERCIAL

## 2024-10-29 ENCOUNTER — ANESTHESIA (OUTPATIENT)
Dept: OPERATING ROOM | Facility: HOSPITAL | Age: 29
End: 2024-10-29
Payer: COMMERCIAL

## 2024-10-29 VITALS
DIASTOLIC BLOOD PRESSURE: 59 MMHG | TEMPERATURE: 98.6 F | BODY MASS INDEX: 30.66 KG/M2 | OXYGEN SATURATION: 98 % | SYSTOLIC BLOOD PRESSURE: 109 MMHG | WEIGHT: 173.06 LBS | RESPIRATION RATE: 16 BRPM | HEART RATE: 83 BPM | HEIGHT: 63 IN

## 2024-10-29 DIAGNOSIS — T84.84XA PAIN DUE TO INTERNAL ORTHOPEDIC PROSTHETIC DEVICES, IMPLANTS AND GRAFTS, INITIAL ENCOUNTER (CMS-HCC): Primary | ICD-10-CM

## 2024-10-29 LAB — PREGNANCY TEST URINE, POC: NEGATIVE

## 2024-10-29 PROCEDURE — 2500000004 HC RX 250 GENERAL PHARMACY W/ HCPCS (ALT 636 FOR OP/ED): Performed by: STUDENT IN AN ORGANIZED HEALTH CARE EDUCATION/TRAINING PROGRAM

## 2024-10-29 PROCEDURE — 7100000002 HC RECOVERY ROOM TIME - EACH INCREMENTAL 1 MINUTE: Performed by: STUDENT IN AN ORGANIZED HEALTH CARE EDUCATION/TRAINING PROGRAM

## 2024-10-29 PROCEDURE — 3700000002 HC GENERAL ANESTHESIA TIME - EACH INCREMENTAL 1 MINUTE: Performed by: STUDENT IN AN ORGANIZED HEALTH CARE EDUCATION/TRAINING PROGRAM

## 2024-10-29 PROCEDURE — 3700000001 HC GENERAL ANESTHESIA TIME - INITIAL BASE CHARGE: Performed by: STUDENT IN AN ORGANIZED HEALTH CARE EDUCATION/TRAINING PROGRAM

## 2024-10-29 PROCEDURE — 2500000004 HC RX 250 GENERAL PHARMACY W/ HCPCS (ALT 636 FOR OP/ED): Mod: JZ

## 2024-10-29 PROCEDURE — 20680 REMOVAL OF IMPLANT DEEP: CPT | Performed by: STUDENT IN AN ORGANIZED HEALTH CARE EDUCATION/TRAINING PROGRAM

## 2024-10-29 PROCEDURE — 7100000009 HC PHASE TWO TIME - INITIAL BASE CHARGE: Performed by: STUDENT IN AN ORGANIZED HEALTH CARE EDUCATION/TRAINING PROGRAM

## 2024-10-29 PROCEDURE — 3600000008 HC OR TIME - EACH INCREMENTAL 1 MINUTE - PROCEDURE LEVEL THREE: Performed by: STUDENT IN AN ORGANIZED HEALTH CARE EDUCATION/TRAINING PROGRAM

## 2024-10-29 PROCEDURE — 3600000003 HC OR TIME - INITIAL BASE CHARGE - PROCEDURE LEVEL THREE: Performed by: STUDENT IN AN ORGANIZED HEALTH CARE EDUCATION/TRAINING PROGRAM

## 2024-10-29 PROCEDURE — 2720000007 HC OR 272 NO HCPCS: Performed by: STUDENT IN AN ORGANIZED HEALTH CARE EDUCATION/TRAINING PROGRAM

## 2024-10-29 PROCEDURE — 2500000004 HC RX 250 GENERAL PHARMACY W/ HCPCS (ALT 636 FOR OP/ED): Performed by: NURSE ANESTHETIST, CERTIFIED REGISTERED

## 2024-10-29 PROCEDURE — 81025 URINE PREGNANCY TEST: CPT

## 2024-10-29 PROCEDURE — 7100000001 HC RECOVERY ROOM TIME - INITIAL BASE CHARGE: Performed by: STUDENT IN AN ORGANIZED HEALTH CARE EDUCATION/TRAINING PROGRAM

## 2024-10-29 PROCEDURE — 7100000010 HC PHASE TWO TIME - EACH INCREMENTAL 1 MINUTE: Performed by: STUDENT IN AN ORGANIZED HEALTH CARE EDUCATION/TRAINING PROGRAM

## 2024-10-29 RX ORDER — LABETALOL HYDROCHLORIDE 5 MG/ML
5 INJECTION, SOLUTION INTRAVENOUS ONCE AS NEEDED
Status: DISCONTINUED | OUTPATIENT
Start: 2024-10-29 | End: 2024-10-29 | Stop reason: HOSPADM

## 2024-10-29 RX ORDER — CEFAZOLIN SODIUM 2 G/100ML
2 INJECTION, SOLUTION INTRAVENOUS ONCE
Status: COMPLETED | OUTPATIENT
Start: 2024-10-29 | End: 2024-10-29

## 2024-10-29 RX ORDER — MIDAZOLAM HYDROCHLORIDE 1 MG/ML
INJECTION, SOLUTION INTRAMUSCULAR; INTRAVENOUS AS NEEDED
Status: DISCONTINUED | OUTPATIENT
Start: 2024-10-29 | End: 2024-10-29

## 2024-10-29 RX ORDER — OXYCODONE HYDROCHLORIDE 5 MG/1
5 TABLET ORAL EVERY 4 HOURS PRN
Status: CANCELLED | OUTPATIENT
Start: 2024-10-29

## 2024-10-29 RX ORDER — GLUCOSAM/CHONDRO/HERB 149/HYAL 750-100 MG
1 TABLET ORAL DAILY
COMMUNITY

## 2024-10-29 RX ORDER — SODIUM CHLORIDE, SODIUM LACTATE, POTASSIUM CHLORIDE, CALCIUM CHLORIDE 600; 310; 30; 20 MG/100ML; MG/100ML; MG/100ML; MG/100ML
INJECTION, SOLUTION INTRAVENOUS CONTINUOUS PRN
Status: DISCONTINUED | OUTPATIENT
Start: 2024-10-29 | End: 2024-10-29

## 2024-10-29 RX ORDER — PROPOFOL 10 MG/ML
INJECTION, EMULSION INTRAVENOUS AS NEEDED
Status: DISCONTINUED | OUTPATIENT
Start: 2024-10-29 | End: 2024-10-29

## 2024-10-29 RX ORDER — FENTANYL CITRATE 50 UG/ML
25 INJECTION, SOLUTION INTRAMUSCULAR; INTRAVENOUS EVERY 5 MIN PRN
Status: DISCONTINUED | OUTPATIENT
Start: 2024-10-29 | End: 2024-10-29 | Stop reason: HOSPADM

## 2024-10-29 RX ORDER — KETOROLAC TROMETHAMINE 30 MG/ML
INJECTION, SOLUTION INTRAMUSCULAR; INTRAVENOUS AS NEEDED
Status: DISCONTINUED | OUTPATIENT
Start: 2024-10-29 | End: 2024-10-29

## 2024-10-29 RX ORDER — BUPIVACAINE HYDROCHLORIDE 5 MG/ML
INJECTION, SOLUTION PERINEURAL AS NEEDED
Status: DISCONTINUED | OUTPATIENT
Start: 2024-10-29 | End: 2024-10-29 | Stop reason: HOSPADM

## 2024-10-29 RX ORDER — DIPHENHYDRAMINE HYDROCHLORIDE 50 MG/ML
INJECTION INTRAMUSCULAR; INTRAVENOUS AS NEEDED
Status: DISCONTINUED | OUTPATIENT
Start: 2024-10-29 | End: 2024-10-29

## 2024-10-29 RX ORDER — LIDOCAINE HYDROCHLORIDE 20 MG/ML
INJECTION, SOLUTION INFILTRATION; PERINEURAL AS NEEDED
Status: DISCONTINUED | OUTPATIENT
Start: 2024-10-29 | End: 2024-10-29

## 2024-10-29 RX ORDER — ONDANSETRON HYDROCHLORIDE 2 MG/ML
INJECTION, SOLUTION INTRAVENOUS AS NEEDED
Status: DISCONTINUED | OUTPATIENT
Start: 2024-10-29 | End: 2024-10-29

## 2024-10-29 RX ORDER — FENTANYL CITRATE 50 UG/ML
INJECTION, SOLUTION INTRAMUSCULAR; INTRAVENOUS AS NEEDED
Status: DISCONTINUED | OUTPATIENT
Start: 2024-10-29 | End: 2024-10-29

## 2024-10-29 RX ORDER — ONDANSETRON HYDROCHLORIDE 2 MG/ML
4 INJECTION, SOLUTION INTRAVENOUS ONCE AS NEEDED
Status: DISCONTINUED | OUTPATIENT
Start: 2024-10-29 | End: 2024-10-29 | Stop reason: HOSPADM

## 2024-10-29 RX ORDER — SODIUM CHLORIDE, SODIUM LACTATE, POTASSIUM CHLORIDE, CALCIUM CHLORIDE 600; 310; 30; 20 MG/100ML; MG/100ML; MG/100ML; MG/100ML
100 INJECTION, SOLUTION INTRAVENOUS CONTINUOUS
Status: DISCONTINUED | OUTPATIENT
Start: 2024-10-29 | End: 2024-10-29 | Stop reason: HOSPADM

## 2024-10-29 ASSESSMENT — COLUMBIA-SUICIDE SEVERITY RATING SCALE - C-SSRS
1. IN THE PAST MONTH, HAVE YOU WISHED YOU WERE DEAD OR WISHED YOU COULD GO TO SLEEP AND NOT WAKE UP?: NO
2. HAVE YOU ACTUALLY HAD ANY THOUGHTS OF KILLING YOURSELF?: NO
6. HAVE YOU EVER DONE ANYTHING, STARTED TO DO ANYTHING, OR PREPARED TO DO ANYTHING TO END YOUR LIFE?: NO

## 2024-10-29 ASSESSMENT — PAIN - FUNCTIONAL ASSESSMENT
PAIN_FUNCTIONAL_ASSESSMENT: 0-10
PAIN_FUNCTIONAL_ASSESSMENT: UNABLE TO SELF-REPORT
PAIN_FUNCTIONAL_ASSESSMENT: 0-10

## 2024-10-29 ASSESSMENT — PAIN SCALES - GENERAL
PAIN_LEVEL: 0
PAINLEVEL_OUTOF10: 4
PAINLEVEL_OUTOF10: 0 - NO PAIN
PAINLEVEL_OUTOF10: 3
PAINLEVEL_OUTOF10: 3
PAINLEVEL_OUTOF10: 5 - MODERATE PAIN
PAINLEVEL_OUTOF10: 0 - NO PAIN

## 2024-10-29 ASSESSMENT — PAIN DESCRIPTION - DESCRIPTORS
DESCRIPTORS: ACHING
DESCRIPTORS: ACHING;DULL
DESCRIPTORS: THROBBING

## 2024-10-29 ASSESSMENT — PAIN DESCRIPTION - LOCATION: LOCATION: ELBOW

## 2024-10-29 ASSESSMENT — PAIN DESCRIPTION - ORIENTATION: ORIENTATION: RIGHT

## 2024-11-15 ENCOUNTER — APPOINTMENT (OUTPATIENT)
Dept: ORTHOPEDIC SURGERY | Facility: CLINIC | Age: 29
End: 2024-11-15
Payer: COMMERCIAL

## 2024-11-15 DIAGNOSIS — Z96.9 RETAINED ORTHOPEDIC HARDWARE: Primary | ICD-10-CM

## 2024-11-15 NOTE — PROGRESS NOTES
"    Chief Complaint   Patient presents with    Right Elbow - Post-op     Open Right Elbow Removal Hardware on 10-29-24       HPI  Patient presents today for follow up of her right elbow.  She is status post open right elbow hard removal 10/29/2024.  Pain much improved with removal of hardware.  Has been compliant with postoperative restrictions..  Denies any numbness or tingling      Past Medical History:   Diagnosis Date    Anxiety     Depression     Exercise-induced asthma (HHS-HCC)     Menstrual periods irregular     Wears glasses        Medication Documentation Review Audit       Reviewed by Merna Forrest RN (Registered Nurse) on 10/29/24 at 0901      Medication Order Taking? Sig Documenting Provider Last Dose Status   albuterol 90 mcg/actuation inhaler [id number] Yes Inhale 2 puffs every 6 hours if needed for wheezing. SHARIF Stack-CNP Past Month Active   Celeste Fe 1.5/30, 28, 1.5 mg-30 mcg (21)/75 mg (7) tablet [id number] Yes TAKE 1 TABLET DAILY SHARIF Wilde-CNM 10/28/2024 Morning Active   magnesium, amino acid chelate, 133 mg tablet [id number]  Take 1 tablet (133 mg) by mouth once daily. Historical Provider, MD  Active   omega 3-dha-epa-fish oil (Fish OiL) 1,000 (120-180) mg capsule [id number]  Take 1 capsule (1,000 mg) by mouth once daily. Historical Provider, MD  Active                    Allergies   Allergen Reactions    Coconut Other     \"My throat gets itchy\" per pt     Pollen Extracts Unknown       Social History     Socioeconomic History    Marital status:      Spouse name: Not on file    Number of children: Not on file    Years of education: Not on file    Highest education level: Not on file   Occupational History    Occupation:    Tobacco Use    Smoking status: Never    Smokeless tobacco: Never   Vaping Use    Vaping status: Never Used   Substance and Sexual Activity    Alcohol use: Yes     Comment: pt drinks 2-3 mix drinks a week    Drug use: Never    " Sexual activity: Not on file     Comment: LMP 9/17   Other Topics Concern    Not on file   Social History Narrative    Not on file     Social Drivers of Health     Financial Resource Strain: Not on file   Food Insecurity: Not on file   Transportation Needs: Not on file   Physical Activity: Not on file   Stress: Not on file   Social Connections: Not on file   Intimate Partner Violence: Not on file   Housing Stability: Not on file       Past Surgical History:   Procedure Laterality Date    ELBOW SURGERY Right     open reduction int fixation    US ASPIRATION INJECTION INTERMEDIATE JOINT  12/01/2021    US ASPIRATION INJECTION INTERMEDIATE JOINT 12/1/2021 [facility]    US ASPIRATION INJECTION INTERMEDIATE JOINT  01/03/2022    US ASPIRATION INJECTION INTERMEDIATE JOINT 1/3/2022 [facility]    WISDOM TOOTH EXTRACTION            Review of Systems   GENERAL: Negative  CV: NEGATIVE  RESPIRATORY: Negative  GI: Negative  MUSCULOSKELETAL: See HPI  SKIN: Negative  NEURO:  Negative     Physical Exam:  General: No acute distress alert and orient x3, alert and cooperative  HEENT: Normocephalic atraumatic.  Pupils round and reactive.  Trachea midline  Cardiovascular: Regular rate and rhythm.  Respiratory: Bilateral chest rise.  Breathing unlabored.  Abdomen: Nontender nondistended no rebounding.  See formal musculoskeletal below:    Affected Extremity:  Focused examination of right elbow: Healing incision.  No active drainage no surrounding erythema able to flex and extend elbow without difficulty.  Full pronation supination.  Hand is warm and well-perfused neurovascular intact    Diagnostics:  FL fluoro images no charge    Result Date: 10/29/2024  These images are not reportable by radiology and will not be interpreted by  Radiologists.       Procedures  Procedures     Assessment:  29-year-old female status post hardware removal elbow    Treatment plan:  Doing well  Discussed activities to avoid as well as  importance of using pain as a guide  She will follow-up as needed  If she has any pain about the elbow issues with activities that she enjoys she will return to clinic for repeat evaluation  Given that we did have to split the triceps tendon discussed that full recovery will likely be 3 months time.  All of the patient's questions concerns answered

## 2025-03-31 ENCOUNTER — APPOINTMENT (OUTPATIENT)
Dept: OBSTETRICS AND GYNECOLOGY | Facility: CLINIC | Age: 30
End: 2025-03-31
Payer: COMMERCIAL

## 2025-03-31 VITALS
SYSTOLIC BLOOD PRESSURE: 124 MMHG | DIASTOLIC BLOOD PRESSURE: 78 MMHG | BODY MASS INDEX: 31.79 KG/M2 | HEIGHT: 63 IN | WEIGHT: 179.4 LBS

## 2025-03-31 DIAGNOSIS — Z31.69 ENCOUNTER FOR PRECONCEPTION CONSULTATION: Primary | ICD-10-CM

## 2025-03-31 PROBLEM — F41.1 ANXIETY, GENERALIZED: Status: RESOLVED | Noted: 2023-02-16 | Resolved: 2025-03-31

## 2025-03-31 PROBLEM — G47.00 INSOMNIA, UNSPECIFIED: Status: RESOLVED | Noted: 2023-02-16 | Resolved: 2025-03-31

## 2025-03-31 PROBLEM — N91.1 SECONDARY AMENORRHEA: Status: RESOLVED | Noted: 2023-02-16 | Resolved: 2025-03-31

## 2025-03-31 PROCEDURE — 1036F TOBACCO NON-USER: CPT | Performed by: ADVANCED PRACTICE MIDWIFE

## 2025-03-31 PROCEDURE — 3008F BODY MASS INDEX DOCD: CPT | Performed by: ADVANCED PRACTICE MIDWIFE

## 2025-03-31 PROCEDURE — 99213 OFFICE O/P EST LOW 20 MIN: CPT | Performed by: ADVANCED PRACTICE MIDWIFE

## 2025-03-31 RX ORDER — PREDNISONE 10 MG/1
10 TABLET ORAL DAILY
COMMUNITY
Start: 2025-03-25 | End: 2025-04-09

## 2025-03-31 ASSESSMENT — PROMIS GLOBAL HEALTH SCALE
RATE_AVERAGE_PAIN: 0
RATE_SOCIAL_SATISFACTION: VERY GOOD
RATE_GENERAL_HEALTH: GOOD
RATE_QUALITY_OF_LIFE: VERY GOOD
RATE_MENTAL_HEALTH: VERY GOOD
EMOTIONAL_PROBLEMS: RARELY
CARRYOUT_SOCIAL_ACTIVITIES: VERY GOOD
RATE_AVERAGE_FATIGUE: MILD
RATE_PHYSICAL_HEALTH: GOOD
CARRYOUT_PHYSICAL_ACTIVITIES: COMPLETELY

## 2025-03-31 NOTE — PROGRESS NOTES
"Liz Arango 29 y.o.  presents for preconception counseling. Currently on OCPs for menorrhagia, considering trying to conceive this summer.     Last pap:  NILM  Significant medical history: None  Significant contributory family history: none  Hx of STIs: None  Taking Prenatal vitamins: Yes  Smoke/Vape Nicotine?: No    Physical Exam  Vitals reviewed.   Constitutional:       Appearance: Normal appearance.   HENT:      Head: Normocephalic and atraumatic.   Pulmonary:      Effort: Pulmonary effort is normal.   Neurological:      General: No focal deficit present.      Mental Status: She is alert and oriented to person, place, and time.   Psychiatric:         Mood and Affect: Mood normal.         Behavior: Behavior normal.          /78   Ht 1.6 m (5' 3\")   Wt 81.4 kg (179 lb 6.4 oz)   LMP 2025   BMI 31.78 kg/m²     Assessment/Plan    Menorrhagia  Discussed scheduled Ibuprofen, 600 mg Q6 hrs while bleeding to help with bleeding and pain  Preconception counseling  Discussed discontinuation of OCPs and return of her menses  Discussed period tracking, signs of ovulation, and timing of intercourse  Discussed healthy lifestyle habits  Start/continue PNVs  Discussed and offered carrier screening, pt Declines  today    RTC for annual exam and as needed    ANGIE Wilde           "

## 2025-04-02 ASSESSMENT — ENCOUNTER SYMPTOMS
COUGH: 0
WOUND: 0
TROUBLE SWALLOWING: 0
ABDOMINAL PAIN: 0
SHORTNESS OF BREATH: 0
CONFUSION: 0
CONSTITUTIONAL NEGATIVE: 1
WHEEZING: 0
BLOOD IN STOOL: 0
HALLUCINATIONS: 0
BACK PAIN: 0
POLYPHAGIA: 0
VOMITING: 0
SORE THROAT: 0
FREQUENCY: 0
DIZZINESS: 0
FATIGUE: 0
EYE PAIN: 0
HEADACHES: 0
BRUISES/BLEEDS EASILY: 0
ADENOPATHY: 0
UNEXPECTED WEIGHT CHANGE: 0
NECK PAIN: 0
PALPITATIONS: 0
APPETITE CHANGE: 0
EYE DISCHARGE: 0
POLYDIPSIA: 0
FEVER: 0
EYE REDNESS: 0
CHILLS: 0
HEMATURIA: 0
DYSURIA: 0

## 2025-04-02 NOTE — PROGRESS NOTES
Subjective   Patient ID: Liz Arango is a 29 y.o. female who presents for Annual Exam.      Is pt fasting?  Yes   Does pt see any providers other than care team below:   maria isabel Cheatham lundgren    Any forms to fill out?  No   Last albuterol inh use- oct. 2024  Taking fish oil 1 a day? Yes   Any other questions or concerns that pt wants to discuss today? Carrier testing for pregnancy- talked to obgyn about this but wanted your thoughts and if we did that kind of testing          Patient Care Team     Relationship Specialty Notifications Start End   Anna Pineda MD Consulting Physician Pediatric Endocrinology Admissions 3/27/23     Address:  [address] [place] [address] Pediatrics-Endocrinology/Metabolism [facility] [id number]       HPI  Last labs-10/2024 ldl 129, cbc nl, cmp nl  1/2024 b12 nl, vit d nl, tsh nl, cbc nl, cmp nl, neg for mono  12/2022 A1c 4.6  Due for labs- all    Cholesterol   Date Value Ref Range Status   03/25/2022 163 0 - 199 mg/dL Final     Comment:     .      AGE      DESIRABLE   BORDERLINE HIGH   HIGH     0-19 Y     0 - 169       170 - 199     >/= 200    20-24 Y     0 - 189       190 - 224     >/= 225         >24 Y     0 - 199       200 - 239     >/= 240   **All ranges are based on fasting samples. Specific   therapeutic targets will vary based on patient-specific   cardiac risk.  .   Pediatric guidelines reference:Pediatrics 2011, 128(S5).   Adult guidelines reference: NCEP ATPIII Guidelines,     JENNIFER 2001, 258:2486-97  .   Venipuncture immediately after or during the    administration of Metamizole may lead to falsely   low results. Testing should be performed immediately   prior to Metamizole dosing.       Triglycerides   Date Value Ref Range Status   03/25/2022 110 0 - 149 mg/dL Final     Comment:     .      AGE      DESIRABLE   BORDERLINE HIGH   HIGH     VERY HIGH   0 D-90 D    19 - 174         ----         ----        ----  91 D- 9 Y     0 -  74        75 -  99     >/= 100      ----    10-19 Y  "    0 -  89        90 - 129     >/= 130      ----    20-24 Y     0 - 114       115 - 149     >/= 150      ----         >24 Y     0 - 149       150 - 199    200- 499    >/= 500  .   Venipuncture immediately after or during the    administration of Metamizole may lead to falsely   low results. Testing should be performed immediately   prior to Metamizole dosing.       HDL   Date Value Ref Range Status   03/25/2022 42.8 mg/dL Final     Comment:     .      AGE      VERY LOW   LOW     NORMAL    HIGH       0-19 Y       < 35   < 40     40-45     ----    20-24 Y       ----   < 40       >45     ----      >24 Y       ----   < 40     40-60      >60  .       No results found for: \"LDL\"  Thyroid Stimulating Hormone   Date Value Ref Range Status   01/29/2024 0.86 0.44 - 3.98 mIU/L Final     No results found for: \"A1C\"  No components found for: \"POCA1C\"  No results found for: \"ALBUR\"  No components found for: \"POCALBUR\"      Other concerns: Carrier testing for pregnancy- talked to obgyn about this but wanted your thoughts and if we did that kind of testing     Stuffy nose, sinus pressure for a  few days  Allergy meds helping    Feeling great mentally and physically, best in 3yrs    bps at home- none    ER/urgicare visits in the last year- 3/2025 PI  Hospitalizations in the last year- none      last Pap- 6/17/24 nl no hpv testing done; due 6/2027  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    last eye dr appt- contacts and glasses-summer 2024  No vision issues    last dental appt- last mon    BMs-regular  Sleep-able to fall asleep and stay asleep-except now with prednisone; no snoring or " apnea  no cp, swelling, sob, abd pain, n/v/d/c, blood in stool or black stools  STI testing including hiv (age 15-65) and hep c screening (18-79)-declines      Immunization History   Administered Date(s) Administered    HPV, Quadrivalent 02/14/2011, 05/03/2011, 11/21/2011    Moderna SARS-CoV-2 Vaccination 08/11/2021, 09/08/2021    Tdap vaccine, age 7 year and older (BOOSTRIX, ADACEL) 06/27/2021         fractures in lifetime-rt elbow  Anyone with osteoporosis in the family-[facility] heart attack, heart surgery-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)   stroke-none    The ASCVD Risk score (Justin DK, et al., 2019) failed to calculate for the following reasons:    The 2019 ASCVD risk score is only valid for ages 40 to 79  Coronary Artery Calcium score:  This test is recommended for men 45 or older and women 55 or older without a history of heart disease and have 1 risk factor (high LDL cholesterol, low HDL cholesterol, high blood pressure, smoker (current or past), type 2 diabetes, IBD, lupus, RA, ankylosing spondylitis, psoriasis or family history of  heart disease <55yrs in dad, brother or child or <65yrs in mom, sister, or child.)       Review of Systems   Constitutional: Negative.  Negative for appetite change, chills, fatigue, fever and unexpected weight change.   HENT:  Negative for congestion, ear pain, sore throat and trouble swallowing.    Eyes:  Negative for pain, discharge and redness.   Respiratory:  Negative for cough, shortness of breath and wheezing.    Cardiovascular:  Negative for chest pain and palpitations.   Gastrointestinal:  Negative for abdominal pain, blood in stool and vomiting.   Endocrine: Negative for polydipsia, polyphagia and polyuria.   Genitourinary:  Negative for dysuria, frequency, hematuria and urgency.   Musculoskeletal:  Negative for back pain and neck pain.   Skin:  Negative for rash and wound.   Allergic/Immunologic: Negative for immunocompromised state.   Neurological:   Negative for dizziness, syncope and headaches.   Hematological:  Negative for adenopathy. Does not bruise/bleed easily.   Psychiatric/Behavioral:  Negative for confusion and hallucinations.        Objective   Visit Vitals  /84   Pulse 97   Temp 36.5 °C (97.7 °F)        BP Readings from Last 3 Encounters:   04/04/25 135/84   03/31/25 124/78   10/29/24 109/59     Wt Readings from Last 3 Encounters:   04/04/25 81.1 kg (178 lb 12.8 oz)   03/31/25 81.4 kg (179 lb 6.4 oz)   10/29/24 78.5 kg (173 lb 1 oz)           Physical Exam  Constitutional:       General: She is not in acute distress.     Appearance: Normal appearance. She is not ill-appearing.   HENT:      Head: Normocephalic.      Right Ear: Tympanic membrane, ear canal and external ear normal.      Left Ear: Tympanic membrane, ear canal and external ear normal.      Nose: Nose normal.      Mouth/Throat:      Mouth: Mucous membranes are moist.      Pharynx: Oropharynx is clear.   Eyes:      Extraocular Movements: Extraocular movements intact.      Conjunctiva/sclera: Conjunctivae normal.      Pupils: Pupils are equal, round, and reactive to light.   Cardiovascular:      Rate and Rhythm: Normal rate and regular rhythm.      Heart sounds: Normal heart sounds. No murmur heard.  Pulmonary:      Effort: Pulmonary effort is normal. No respiratory distress.      Breath sounds: Normal breath sounds. No wheezing, rhonchi or rales.   Abdominal:      General: Bowel sounds are normal.      Palpations: Abdomen is soft. There is no mass.      Tenderness: There is no abdominal tenderness.   Musculoskeletal:         General: No swelling or tenderness. Normal range of motion.      Cervical back: Normal range of motion and neck supple.      Right lower leg: No edema.      Left lower leg: No edema.   Skin:     General: Skin is warm.      Findings: No rash.   Neurological:      General: No focal deficit present.      Mental Status: She is alert and oriented to person, place, and  time.      Cranial Nerves: No cranial nerve deficit.      Motor: No weakness.   Psychiatric:         Mood and Affect: Mood normal.         Behavior: Behavior normal.       Assessment/Plan   Diagnoses and all orders for this visit:  Routine general medical examination at a health care facility  -     Comprehensive Metabolic Panel; Future  -     CBC and Auto Differential; Future  -     Lipid Panel; Future  -     TSH with reflex to Free T4 if abnormal; Future  BMI 31.0-31.9,adult  Other orders  -     Follow Up In Primary Care - Health Maintenance  -     Follow Up In Primary Care - Health Maintenance; Future

## 2025-04-04 ENCOUNTER — APPOINTMENT (OUTPATIENT)
Dept: PRIMARY CARE | Facility: CLINIC | Age: 30
End: 2025-04-04
Payer: COMMERCIAL

## 2025-04-04 VITALS
SYSTOLIC BLOOD PRESSURE: 135 MMHG | DIASTOLIC BLOOD PRESSURE: 84 MMHG | HEIGHT: 63 IN | BODY MASS INDEX: 31.68 KG/M2 | OXYGEN SATURATION: 95 % | WEIGHT: 178.8 LBS | TEMPERATURE: 97.7 F | HEART RATE: 97 BPM

## 2025-04-04 DIAGNOSIS — Z00.00 ROUTINE GENERAL MEDICAL EXAMINATION AT A HEALTH CARE FACILITY: Primary | ICD-10-CM

## 2025-04-04 PROCEDURE — 1036F TOBACCO NON-USER: CPT | Performed by: NURSE PRACTITIONER

## 2025-04-04 PROCEDURE — 99395 PREV VISIT EST AGE 18-39: CPT | Performed by: NURSE PRACTITIONER

## 2025-04-04 PROCEDURE — 3008F BODY MASS INDEX DOCD: CPT | Performed by: NURSE PRACTITIONER

## 2025-04-04 ASSESSMENT — PATIENT HEALTH QUESTIONNAIRE - PHQ9
1. LITTLE INTEREST OR PLEASURE IN DOING THINGS: NOT AT ALL
SUM OF ALL RESPONSES TO PHQ9 QUESTIONS 1 AND 2: 0
2. FEELING DOWN, DEPRESSED OR HOPELESS: NOT AT ALL

## 2025-04-04 NOTE — PATIENT INSTRUCTIONS
Contact ob gyn about genetic testing for pregnancy      Handouts given to pt:  physical handout      Labs- No appt needed:    You can use the lab in our building when fasting. The hrs are: Mon-Fri 7a-330p.  No Saturday hrs. Bring the paper order.   OR   [facility] Mon-Fri 7a-12p. No Saturday hrs. Bring the paper order.  OR   [facility] Mon-Fri 630a-530p or Sat 6:30a-12p. Bring the paper order  OR  [facility] Mon-FrI 7a-5p  [facility] Mon-Fri 730a-4p, Sat  8a-12p  [facility] [address] Mon-Thurs 630a-6p , Fri 630a-4p, Sat 8a-12p  [facility] [address] Mon-Fri 7a-630p and Sat. 7a-3p    Fasting is no food, drink, gum or mints other than water for 12 hrs.   Results will be back in 2-3 business days for most labs. It is always recommended for any orders (labs, xrays, ultrasounds,MRI, ct scan, procedures etc) to check with your insurance provider for expected costs or expenses to you.         You will get your results via phone from my medical assistant if you do not have MyChart.  OR  You will get your results via [employer]    If a result is urgent, I will call to speak to you.    Vaccines:  Up to date    General recommendations:  Exercise-cardio 4-5d/wk 30min each day  Diet-Breakfast-toast (my favorite Nat Bryant Delighful Multigrain or Vimal's Killer Bread Good Seed thin-sliced)/bagel/English muffin-whole wheat flour as a 1st ingredient or cereal/oatmeal/granola bar-fiber 4g or more or protein like eggs or peanut butter; optional veggies  Lunch-protein, 1/2c carb or 2 slices bread, veg 1c  Dinner-protein, fist sized carb, veg 1c  Fruit 2 a day  Dairy 2 a day-milk, soy milk, almond milk, cheese, yogurt, cottage cheese  Snacks-Protein-hard boiled egg, nuts (walnuts/almonds/pecans/pistachios 1/4c), hummus, beef/deer jerky or meat sticks; vegetable, fruit, dairy-milk(1%, skim, almond, soy)/cheese (not a lot of cheddar)/yogurt (Greek is best-my favorite Dannon  Fruit on the Bottom Greek)/cottage cheese 2%; triscuits/ popcorn/wheat thins have a lot of fiber; follow serving size on bag/box/container  increase water  Limit alcohol to 1 drink per day for women and 2 drinks per day for men (1 drink=12oz beer or 5oz wine or 1 1/2oz liquor)  Calcium: 500mg 1 twice a day if age 50 and younger and 600mg 1 twice a day if over age 50 (calcium citrate can be taken without food)  Vitamin D: 800-5000 IU/day  Limit salt to <2300mg a day if age 50 and under and <1500mg a day if over age 50/have high bp or diabetes or kidney disease  Recommend folate for childbearing age women 0.4mg per day (can be found in a multivitamin)  Recommend 18mg/dL of iron a day if age 50 and under and 8mg/dL a day if over age 50; take on an empty stomach at bedtime  Use sunscreen   Wear seatbelt  Recommend safe sex practices: using condoms everytime you have sex, discuss with a new partner about their past partners/history of STDs/drug use, avoid drinking alcohol or using drugs as this increases the chance that you will participate in high-risk sex, for oral sex help protect your mouth by having your partner use a condom (male or female), women should not douche after sex, be aware of your partner's body and your body-look for signs of a sore, blister, rash, or discharge, and have regular exams and periodic tests for STDs.  No distracted driving  No driving when under influence of substances  Wear a seatbelt  Eye dr every 1-2yrs  Dentist every 6-12 mon  Tetanus shot every 10yrs  Recommend flu vaccine in the fall  Appt in 1 year for physical      I will communicate with you via [employer] regarding messages and results. If you need help with this, you can call the support line at [phone].    IT WAS A PLEASURE TO SEE YOU TODAY. THANK YOU FOR CHOOSING US FOR YOUR HEALTHCARE NEEDS.

## 2025-04-05 LAB
ALBUMIN SERPL-MCNC: 4.2 G/DL (ref 3.6–5.1)
ALP SERPL-CCNC: 41 U/L (ref 31–125)
ALT SERPL-CCNC: 20 U/L (ref 6–29)
ANION GAP SERPL CALCULATED.4IONS-SCNC: 8 MMOL/L (CALC) (ref 7–17)
AST SERPL-CCNC: 21 U/L (ref 10–30)
BASOPHILS # BLD AUTO: 83 CELLS/UL (ref 0–200)
BASOPHILS NFR BLD AUTO: 0.6 %
BILIRUB SERPL-MCNC: 0.9 MG/DL (ref 0.2–1.2)
BUN SERPL-MCNC: 15 MG/DL (ref 7–25)
CALCIUM SERPL-MCNC: 9.2 MG/DL (ref 8.6–10.2)
CHLORIDE SERPL-SCNC: 104 MMOL/L (ref 98–110)
CHOLEST SERPL-MCNC: 210 MG/DL
CHOLEST/HDLC SERPL: 4 (CALC)
CO2 SERPL-SCNC: 29 MMOL/L (ref 20–32)
CREAT SERPL-MCNC: 0.82 MG/DL (ref 0.5–0.96)
EGFRCR SERPLBLD CKD-EPI 2021: 100 ML/MIN/1.73M2
EOSINOPHIL # BLD AUTO: 166 CELLS/UL (ref 15–500)
EOSINOPHIL NFR BLD AUTO: 1.2 %
ERYTHROCYTE [DISTWIDTH] IN BLOOD BY AUTOMATED COUNT: 12.1 % (ref 11–15)
GLUCOSE SERPL-MCNC: 81 MG/DL (ref 65–99)
HCT VFR BLD AUTO: 42.3 % (ref 35–45)
HDLC SERPL-MCNC: 52 MG/DL
HGB BLD-MCNC: 13.9 G/DL (ref 11.7–15.5)
LDLC SERPL CALC-MCNC: 134 MG/DL (CALC)
LYMPHOCYTES # BLD AUTO: 5327 CELLS/UL (ref 850–3900)
LYMPHOCYTES NFR BLD AUTO: 38.6 %
MCH RBC QN AUTO: 30.6 PG (ref 27–33)
MCHC RBC AUTO-ENTMCNC: 32.9 G/DL (ref 32–36)
MCV RBC AUTO: 93.2 FL (ref 80–100)
MONOCYTES # BLD AUTO: 1325 CELLS/UL (ref 200–950)
MONOCYTES NFR BLD AUTO: 9.6 %
NEUTROPHILS # BLD AUTO: 6900 CELLS/UL (ref 1500–7800)
NEUTROPHILS NFR BLD AUTO: 50 %
NONHDLC SERPL-MCNC: 158 MG/DL (CALC)
PLATELET # BLD AUTO: 288 THOUSAND/UL (ref 140–400)
PMV BLD REES-ECKER: 12.1 FL (ref 7.5–12.5)
POTASSIUM SERPL-SCNC: 3.9 MMOL/L (ref 3.5–5.3)
PROT SERPL-MCNC: 6.9 G/DL (ref 6.1–8.1)
RBC # BLD AUTO: 4.54 MILLION/UL (ref 3.8–5.1)
SODIUM SERPL-SCNC: 141 MMOL/L (ref 135–146)
TRIGL SERPL-MCNC: 127 MG/DL
TSH SERPL-ACNC: 1.03 MIU/L
WBC # BLD AUTO: 13.8 THOUSAND/UL (ref 3.8–10.8)

## 2025-04-06 DIAGNOSIS — Z31.69 ENCOUNTER FOR PRECONCEPTION CONSULTATION: Primary | ICD-10-CM

## 2025-04-06 DIAGNOSIS — D72.821 MONOCYTOSIS: ICD-10-CM

## 2025-04-06 DIAGNOSIS — D72.820 ELEVATED LYMPHOCYTES: Primary | ICD-10-CM

## 2025-04-08 LAB
ALBUMIN SERPL-MCNC: 4.2 G/DL (ref 3.6–5.1)
ALP SERPL-CCNC: 41 U/L (ref 31–125)
ALT SERPL-CCNC: 20 U/L (ref 6–29)
ANA SER QL IF: NEGATIVE
ANION GAP SERPL CALCULATED.4IONS-SCNC: 8 MMOL/L (CALC) (ref 7–17)
AST SERPL-CCNC: 21 U/L (ref 10–30)
BASOPHILS # BLD AUTO: 83 CELLS/UL (ref 0–200)
BASOPHILS NFR BLD AUTO: 0.6 %
BILIRUB SERPL-MCNC: 0.9 MG/DL (ref 0.2–1.2)
BUN SERPL-MCNC: 15 MG/DL (ref 7–25)
CALCIUM SERPL-MCNC: 9.2 MG/DL (ref 8.6–10.2)
CHLORIDE SERPL-SCNC: 104 MMOL/L (ref 98–110)
CHOLEST SERPL-MCNC: 210 MG/DL
CHOLEST/HDLC SERPL: 4 (CALC)
CO2 SERPL-SCNC: 29 MMOL/L (ref 20–32)
CREAT SERPL-MCNC: 0.82 MG/DL (ref 0.5–0.96)
EGFRCR SERPLBLD CKD-EPI 2021: 100 ML/MIN/1.73M2
EOSINOPHIL # BLD AUTO: 166 CELLS/UL (ref 15–500)
EOSINOPHIL NFR BLD AUTO: 1.2 %
ERYTHROCYTE [DISTWIDTH] IN BLOOD BY AUTOMATED COUNT: 12.1 % (ref 11–15)
GLUCOSE SERPL-MCNC: 81 MG/DL (ref 65–99)
HCT VFR BLD AUTO: 42.3 % (ref 35–45)
HDLC SERPL-MCNC: 52 MG/DL
HGB BLD-MCNC: 13.9 G/DL (ref 11.7–15.5)
LDLC SERPL CALC-MCNC: 134 MG/DL (CALC)
LYMPHOCYTES # BLD AUTO: 5327 CELLS/UL (ref 850–3900)
LYMPHOCYTES NFR BLD AUTO: 38.6 %
MCH RBC QN AUTO: 30.6 PG (ref 27–33)
MCHC RBC AUTO-ENTMCNC: 32.9 G/DL (ref 32–36)
MCV RBC AUTO: 93.2 FL (ref 80–100)
MONOCYTES # BLD AUTO: 1325 CELLS/UL (ref 200–950)
MONOCYTES NFR BLD AUTO: 9.6 %
NEUTROPHILS # BLD AUTO: 6900 CELLS/UL (ref 1500–7800)
NEUTROPHILS NFR BLD AUTO: 50 %
NONHDLC SERPL-MCNC: 158 MG/DL (CALC)
PLATELET # BLD AUTO: 288 THOUSAND/UL (ref 140–400)
PMV BLD REES-ECKER: 12.1 FL (ref 7.5–12.5)
POTASSIUM SERPL-SCNC: 3.9 MMOL/L (ref 3.5–5.3)
PROT SERPL-MCNC: 6.9 G/DL (ref 6.1–8.1)
RBC # BLD AUTO: 4.54 MILLION/UL (ref 3.8–5.1)
SODIUM SERPL-SCNC: 141 MMOL/L (ref 135–146)
TRIGL SERPL-MCNC: 127 MG/DL
TSH SERPL-ACNC: 1.03 MIU/L
WBC # BLD AUTO: 13.8 THOUSAND/UL (ref 3.8–10.8)

## 2025-04-09 ENCOUNTER — OFFICE VISIT (OUTPATIENT)
Dept: PRIMARY CARE | Facility: CLINIC | Age: 30
End: 2025-04-09
Payer: COMMERCIAL

## 2025-04-09 VITALS
HEART RATE: 85 BPM | BODY MASS INDEX: 31.64 KG/M2 | TEMPERATURE: 97.5 F | DIASTOLIC BLOOD PRESSURE: 82 MMHG | WEIGHT: 178.6 LBS | SYSTOLIC BLOOD PRESSURE: 128 MMHG | OXYGEN SATURATION: 94 % | HEIGHT: 63 IN

## 2025-04-09 DIAGNOSIS — L23.7 POISON IVY: Primary | ICD-10-CM

## 2025-04-09 PROCEDURE — 99213 OFFICE O/P EST LOW 20 MIN: CPT | Performed by: NURSE PRACTITIONER

## 2025-04-09 PROCEDURE — 3008F BODY MASS INDEX DOCD: CPT | Performed by: NURSE PRACTITIONER

## 2025-04-09 PROCEDURE — 96372 THER/PROPH/DIAG INJ SC/IM: CPT | Performed by: NURSE PRACTITIONER

## 2025-04-09 PROCEDURE — 1036F TOBACCO NON-USER: CPT | Performed by: NURSE PRACTITIONER

## 2025-04-09 RX ORDER — TRIAMCINOLONE ACETONIDE 1 MG/G
CREAM TOPICAL 2 TIMES DAILY
Qty: 80 G | Refills: 0 | Status: CANCELLED | OUTPATIENT
Start: 2025-04-09

## 2025-04-09 RX ORDER — PREDNISONE 20 MG/1
TABLET ORAL
Qty: 20 TABLET | Refills: 0 | Status: SHIPPED | OUTPATIENT
Start: 2025-04-09

## 2025-04-09 RX ORDER — TRIAMCINOLONE ACETONIDE 40 MG/ML
60 INJECTION, SUSPENSION INTRA-ARTICULAR; INTRAMUSCULAR ONCE
Status: COMPLETED | OUTPATIENT
Start: 2025-04-09 | End: 2025-04-09

## 2025-04-09 RX ADMIN — TRIAMCINOLONE ACETONIDE 60 MG: 40 INJECTION, SUSPENSION INTRA-ARTICULAR; INTRAMUSCULAR at 13:04

## 2025-04-09 ASSESSMENT — ENCOUNTER SYMPTOMS
FEVER: 0
SHORTNESS OF BREATH: 0
WHEEZING: 0
CHILLS: 0
CONSTITUTIONAL NEGATIVE: 1

## 2025-04-09 ASSESSMENT — PATIENT HEALTH QUESTIONNAIRE - PHQ9
2. FEELING DOWN, DEPRESSED OR HOPELESS: NOT AT ALL
1. LITTLE INTEREST OR PLEASURE IN DOING THINGS: NOT AT ALL
SUM OF ALL RESPONSES TO PHQ9 QUESTIONS 1 AND 2: 0

## 2025-04-09 NOTE — PROGRESS NOTES
Subjective   Patient ID: Liz Arango is a 29 y.o. female who presents for Rash.  Last physical: 4/4/25; has next appt scheduled    Pt stated 3/24/25 that she had a PI rash for 1wk on 3/24/25.   When did it go away?  Last Monday was pretty much gone to all the way gone   When did it return? Saturday       HPI  Pt had red poison ivy rash for a little over a week starting 3/17/25. It started on her arms then spread to her stomach.   Selftxt- Karli Dry   [facility]nisone x 15d; finished 3d ago and triamcinolone cr  Went away 10d ago but came back 5d ago on face, chin, stomach, sides hands  Itchy, red  Not painful  No discharge-blisters on fingers  No recent exposure to PI but she does have dogs  Selftxt -triamcinolone cr      Patient Care Team     Relationship Specialty Notifications Start End   Anna Pineda MD Consulting Physician Pediatric Endocrinology Admissions 3/27/23     Address:  [address] [place] [address] Pediatrics-Endocrinology/Metabolism [place] [id number]        Review of Systems   Constitutional: Negative.  Negative for chills and fever.   Respiratory:  Negative for shortness of breath and wheezing.    Cardiovascular:  Negative for chest pain.   Skin:  Positive for rash.       Objective   Visit Vitals  /82   Pulse 85   Temp 36.4 °C (97.5 °F)      BP Readings from Last 3 Encounters:   04/09/25 128/82   04/04/25 135/84   03/31/25 124/78     Wt Readings from Last 3 Encounters:   04/09/25 81 kg (178 lb 9.6 oz)   04/04/25 81.1 kg (178 lb 12.8 oz)   03/31/25 81.4 kg (179 lb 6.4 oz)       Physical Exam  Constitutional:       General: She is not in acute distress.     Appearance: Normal appearance.   Skin:     Comments: Red raised rash on face and chin and stomach/sides-some are linear  Skin colored 2mm bumps on noted on fingers     Neurological:      Mental Status: She is alert.       Assessment/Plan   Diagnoses and all orders for this visit:  Poison ivy  -     predniSONE (Deltasone) 20  mg tablet; 2 tabs daily x 3d with food  -     triamcinolone acetonide (Kenalog-40) injection 60 mg  -     Referral to Dermatology        See patient instructions for full plan

## 2025-04-09 NOTE — PATIENT INSTRUCTIONS
Kenalog shot today  Start prednisone tomorrow for 3d with food  See derm if sx go away and return  See derm if sx not go away by Monday  Call if sx worsen or change    Keep April 2026 appt    I will communicate with you via [facility] regarding messages and results. If you need help with this, you can call the support line at [phone].    IT WAS A PLEASURE TO SEE YOU TODAY. THANK YOU FOR CHOOSING US FOR YOUR HEALTHCARE NEEDS.

## 2025-04-20 DIAGNOSIS — D72.821 MONOCYTOSIS: ICD-10-CM

## 2025-04-20 DIAGNOSIS — D72.820 ELEVATED LYMPHOCYTES: ICD-10-CM

## 2025-04-22 LAB
Lab: NORMAL
Lab: NORMAL
NON-UH HIE MISC SENDOUT: NORMAL
NON-UH HIE MISC SENDOUT: NORMAL

## 2025-07-14 DIAGNOSIS — N91.1 SECONDARY AMENORRHEA: ICD-10-CM

## 2025-07-15 RX ORDER — NORETHINDRONE ACETATE AND ETHINYL ESTRADIOL AND FERROUS FUMARATE 1.5-30(21)
1 KIT ORAL DAILY
Qty: 84 TABLET | Refills: 3 | Status: SHIPPED | OUTPATIENT
Start: 2025-07-15

## 2026-04-08 ENCOUNTER — APPOINTMENT (OUTPATIENT)
Dept: PRIMARY CARE | Facility: CLINIC | Age: 31
End: 2026-04-08
Payer: COMMERCIAL

## (undated) DEVICE — BANDAGE, ESMARK 4 IN X 9 FT, STERILE

## (undated) DEVICE — STRAP, VELCRO, BODY, 4 X 60IN, NS

## (undated) DEVICE — DRAPE, C-ARM IMAGE

## (undated) DEVICE — GLOVE, SURGICAL, PROTEXIS PI BLUE W/NEUTHERA, 8.0, PF, LF

## (undated) DEVICE — Device

## (undated) DEVICE — SUTURE, VICRYL 0, TAPER POINT, CT-1 VIOLET 27 INCH

## (undated) DEVICE — DRAPE, SHEET, HAND, GUSSETTED, W/TABLE EXTENSION, 77 X 146 IN, DISPOSABLE, LF, STERILE

## (undated) DEVICE — BANDAGE, ELASTIC, 4 X 10YD, BEIGE, LF

## (undated) DEVICE — TOWEL PACK, STERILE, 16X24, XRAY DETECTABLE, BLUE, 4/PK

## (undated) DEVICE — PROTECTOR, NERVE, ULNAR, PINK

## (undated) DEVICE — SOLUTION, TOPICAL, ALCOHOL, ISOPROPYL 70%, 16 OZ

## (undated) DEVICE — DRESSING, GAUZE, SPONGE, 12 PLY, 4 X 4 IN, PLASTIC POUCH, STRL 10PK

## (undated) DEVICE — GLOVE, SURGICAL, PROTEXIS PI , 7.5, PF, LF

## (undated) DEVICE — MANIFOLD, 4 PORT NEPTUNE STANDARD

## (undated) DEVICE — PADDING, CAST, SPECIALIST, 4 IN X 4 YD, STERILE

## (undated) DEVICE — DRESSING, GAUZE, PETROLATUM, STRIP, XEROFORM, 1 X 8 IN, STERILE

## (undated) DEVICE — TIP, SUCTION, YANKAUER, FLEXIBLE

## (undated) DEVICE — SPLINT, SAFETY, PRE-CUT, 4 X 30 IN

## (undated) DEVICE — APPLICATOR, CHLORAPREP, W/ORANGE TINT, 26ML

## (undated) DEVICE — BANDAGE, COFLEX, 4 X 5 YDS, FOAM TAN, STERILE, LF

## (undated) DEVICE — TOWEL PACK 10-PK

## (undated) DEVICE — DRAPE, SHEET, U, W/ADHESIVE STRIP, IMPERVIOUS, 60 X 70 IN, DISPOSABLE, LF, STERILE

## (undated) DEVICE — STRAP, ARM BOARD, 32 X 1.5

## (undated) DEVICE — STOCKINETTE, IMPERVIOUS, LARGE, 9IN X 48IN

## (undated) DEVICE — 5.0 DRILL BIT

## (undated) DEVICE — PADDING, CAST, SPECIALIST, 6 IN X 4 YD, STERILE

## (undated) DEVICE — STAPLER, SKIN, FIXED HEAD, WIDE, 35

## (undated) DEVICE — GOWN, SURGICAL, ROYAL SILK, XL, STERILE

## (undated) DEVICE — SOLUTION, IRRIGATION, USP, SODIUM CHLORIDE 0.9%, 3000 ML

## (undated) DEVICE — CAUTERY, PENCIL, PUSH BUTTON, SMOKE EVAC, 70MM

## (undated) DEVICE — BATH BLANKET STERILE

## (undated) DEVICE — MAT, FLOOR, STANDARD FLUID BARRIER, 32X44, GREEN

## (undated) DEVICE — DRAPE, SHEET, XL

## (undated) DEVICE — GOWN, SURGICAL, ROYAL SILK, LG, STERILE